# Patient Record
Sex: MALE | Race: WHITE
[De-identification: names, ages, dates, MRNs, and addresses within clinical notes are randomized per-mention and may not be internally consistent; named-entity substitution may affect disease eponyms.]

---

## 2017-05-13 ENCOUNTER — HOSPITAL ENCOUNTER (OUTPATIENT)
Dept: HOSPITAL 45 - C.LAB | Age: 72
Discharge: HOME | End: 2017-05-13
Attending: FAMILY MEDICINE
Payer: COMMERCIAL

## 2017-05-13 DIAGNOSIS — S30.861A: Primary | ICD-10-CM
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
## 2017-05-13 LAB
LYME DISEASE AB IGG: (no result)
LYME DISEASE AB IGM: (no result)

## 2018-04-03 ENCOUNTER — OFFICE VISIT (OUTPATIENT)
Dept: CARDIOLOGY | Facility: CLINIC | Age: 73
End: 2018-04-03
Payer: COMMERCIAL

## 2018-04-03 ENCOUNTER — HOSPITAL ENCOUNTER (OUTPATIENT)
Dept: RADIOLOGY | Facility: HOSPITAL | Age: 73
Discharge: HOME | End: 2018-04-03
Attending: ORTHOPAEDIC SURGERY
Payer: COMMERCIAL

## 2018-04-03 ENCOUNTER — TRANSCRIBE ORDERS (OUTPATIENT)
Dept: LAB | Facility: HOSPITAL | Age: 73
End: 2018-04-03

## 2018-04-03 ENCOUNTER — OFFICE VISIT (OUTPATIENT)
Dept: PREADMISSION TESTING | Facility: HOSPITAL | Age: 73
End: 2018-04-03
Payer: COMMERCIAL

## 2018-04-03 VITALS
HEIGHT: 73 IN | WEIGHT: 217 LBS | BODY MASS INDEX: 28.76 KG/M2 | SYSTOLIC BLOOD PRESSURE: 124 MMHG | RESPIRATION RATE: 18 BRPM | TEMPERATURE: 97.2 F | HEART RATE: 60 BPM | DIASTOLIC BLOOD PRESSURE: 74 MMHG

## 2018-04-03 VITALS
OXYGEN SATURATION: 98 % | DIASTOLIC BLOOD PRESSURE: 70 MMHG | HEART RATE: 59 BPM | HEIGHT: 73 IN | WEIGHT: 217.13 LBS | SYSTOLIC BLOOD PRESSURE: 110 MMHG | BODY MASS INDEX: 28.78 KG/M2

## 2018-04-03 DIAGNOSIS — M43.16 SPONDYLOLISTHESIS OF LUMBAR REGION: ICD-10-CM

## 2018-04-03 DIAGNOSIS — Z01.810 PREOP CARDIOVASCULAR EXAM: ICD-10-CM

## 2018-04-03 DIAGNOSIS — M43.16 SPONDYLOLISTHESIS OF LUMBAR REGION: Primary | ICD-10-CM

## 2018-04-03 DIAGNOSIS — Z01.818 PRE-OP EVALUATION: Primary | ICD-10-CM

## 2018-04-03 DIAGNOSIS — M51.9 LUMBAR DISC DISEASE: ICD-10-CM

## 2018-04-03 PROBLEM — C61 PROSTATE CANCER (CMS/HCC): Status: ACTIVE | Noted: 2018-04-03

## 2018-04-03 LAB
ABO + RH BLD: NORMAL
ALBUMIN SERPL-MCNC: 4.4 G/DL (ref 3.4–5)
ALP SERPL-CCNC: 50 IU/L (ref 35–126)
ALT SERPL-CCNC: 15 IU/L (ref 16–63)
ANION GAP SERPL CALC-SCNC: 8 MEQ/L (ref 3–15)
APTT PPP: 67 SEC. (ref 23–35)
AST SERPL-CCNC: 20 IU/L (ref 15–41)
BASOPHILS # BLD: 0.05 K/UL (ref 0.01–0.1)
BASOPHILS NFR BLD: 0.7 %
BILIRUB SERPL-MCNC: 0.6 MG/DL (ref 0.3–1.2)
BLD GP AB SCN SERPL QL: NEGATIVE
BUN SERPL-MCNC: 14 MG/DL (ref 8–20)
CALCIUM SERPL-MCNC: 10 MG/DL (ref 8.9–10.3)
CHLORIDE SERPL-SCNC: 105 MMOL/L (ref 98–109)
CO2 SERPL-SCNC: 28 MMOL/L (ref 22–32)
CREAT SERPL-MCNC: 1.1 MG/DL (ref 0.8–1.3)
D AG BLD QL: POSITIVE
EOSINOPHIL # BLD: 0.1 K/UL (ref 0.04–0.54)
EOSINOPHIL NFR BLD: 1.3 %
ERYTHROCYTE [DISTWIDTH] IN BLOOD BY AUTOMATED COUNT: 12.8 % (ref 11.6–14.4)
GFR SERPL CREATININE-BSD FRML MDRD: >60 ML/MIN/1.73M*2
GLUCOSE SERPL-MCNC: 90 MG/DL (ref 70–99)
HCT VFR BLDCO AUTO: 48.3 % (ref 40–51)
HGB BLD-MCNC: 16.4 G/DL (ref 13.7–17.5)
IMM GRANULOCYTES # BLD AUTO: 0.02 K/UL (ref 0–0.08)
IMM GRANULOCYTES NFR BLD AUTO: 0.3 %
INR PPP: 1.4 INR
LYMPHOCYTES # BLD: 2.11 K/UL (ref 1.2–3.5)
LYMPHOCYTES NFR BLD: 28.1 %
MCH RBC QN AUTO: 31 PG (ref 28–33.2)
MCHC RBC AUTO-ENTMCNC: 34 G/DL (ref 32.2–36.5)
MCV RBC AUTO: 91.3 FL (ref 83–98)
MONOCYTES # BLD: 0.64 K/UL (ref 0.3–1)
MONOCYTES NFR BLD: 8.5 %
NEUTROPHILS # BLD: 4.58 K/UL (ref 1.7–7)
NEUTS SEG NFR BLD: 61.1 %
NRBC BLD-RTO: 0 %
PDW BLD AUTO: 9.6 FL (ref 9.4–12.4)
PLATELET # BLD AUTO: 274 K/UL (ref 150–350)
POTASSIUM SERPL-SCNC: 4.5 MMOL/L (ref 3.6–5.1)
PROT SERPL-MCNC: 6.8 G/DL (ref 6–8.2)
PROTHROMBIN TIME: 17.1 SEC. (ref 12.2–14.5)
RBC # BLD AUTO: 5.29 M/UL (ref 4.5–5.8)
SODIUM SERPL-SCNC: 141 MMOL/L (ref 136–144)
WBC # BLD AUTO: 7.5 K/UL (ref 3.8–10.5)

## 2018-04-03 PROCEDURE — 99204 OFFICE O/P NEW MOD 45 MIN: CPT | Performed by: INTERNAL MEDICINE

## 2018-04-03 PROCEDURE — 93000 ELECTROCARDIOGRAM COMPLETE: CPT | Performed by: INTERNAL MEDICINE

## 2018-04-03 PROCEDURE — 85730 THROMBOPLASTIN TIME PARTIAL: CPT

## 2018-04-03 PROCEDURE — 85025 COMPLETE CBC W/AUTO DIFF WBC: CPT

## 2018-04-03 PROCEDURE — 86850 RBC ANTIBODY SCREEN: CPT

## 2018-04-03 PROCEDURE — 80053 COMPREHEN METABOLIC PANEL: CPT

## 2018-04-03 PROCEDURE — 85610 PROTHROMBIN TIME: CPT

## 2018-04-03 PROCEDURE — 71046 X-RAY EXAM CHEST 2 VIEWS: CPT

## 2018-04-03 PROCEDURE — 36415 COLL VENOUS BLD VENIPUNCTURE: CPT

## 2018-04-03 ASSESSMENT — ENCOUNTER SYMPTOMS
ALTERED MENTAL STATUS: 0
WEAKNESS: 0
NAUSEA: 0
HEMATURIA: 0
FREQUENCY: 0
BRUISES/BLEEDS EASILY: 0
CHEST TIGHTNESS: 0
DYSPNEA ON EXERTION: 0
BACK PAIN: 1
DIZZINESS: 0
HEARTBURN: 1
NUMBNESS: 0
CHILLS: 0
WOUND: 0
APNEA: 0
PALPITATIONS: 0
BACK PAIN: 1
PALPITATIONS: 0
SHORTNESS OF BREATH: 0
CONSTIPATION: 0
WEAKNESS: 0
ABDOMINAL PAIN: 0
SYNCOPE: 0
DYSURIA: 0
LIGHT-HEADEDNESS: 0
FEVER: 0
SHORTNESS OF BREATH: 0
VOMITING: 0

## 2018-04-03 ASSESSMENT — PAIN SCALES - GENERAL: PAINLEVEL: 2
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
## 2018-04-03 NOTE — PRE-PROCEDURE INSTRUCTIONS
1. We will call you between 4pm to 7pm on the date before your surgery to determine that arrival time for your procedure.   2. Please report to outpatient registration on the day of your procedure.   3. Please follow the following fasting guidelines:   · Nothing to eat or drink 8 hours prior to arrival   4. Early on the morning of the procedure please take your usual dose of the listed medications with a sip of water:    none   5. Other Instructions: complete body wash as directed   6. If you develop a cold, cough, fever, rash, or other symptom prior to the data of the procedure, please report it to your physician immediately.   7. If you need to cancel the procedure for any reason, please contact your physician or call the unit listed above.   8. Make arrangements to have someone drive you home from the procedure. If you have not arranged for transportation home, your surgery may be cancelled.    9. You may not take public transportation unless accompanied by a responsible person.   10. You may not drive a car or operate complex or potentially dangerous machinery for 24 hours following anesthesia and/or sedation.   11. If it is medically necessary for you to have a longer stay, you will be informed as soon as the decision is made.   12. Do not wear or being anything of value to the hospital including jewelry of any kind. Do not wear make-up or contact lenses. DO bring your glasses and hearing aid.   13. Dress in comfortable clothes.   14.  If instructed, please bring a copy of your Advanced Directive (Living Will/Durable Power of ) on the day of your procedure.      Pre operative instructions given as per protocol.  Form explained by: PERFECTO Smallwood     I have read and understand the above information. I have had sufficient opportunity to ask questions I might have and they have been answered to my satisfaction. I agree to comply with the Patient Responsibilities listed above and have received a  copy of this form.

## 2018-04-03 NOTE — ASSESSMENT & PLAN NOTE
There is no evidence of any active or ongoing cardiac condition at this time.  There is no evidence of any active or ongoing cardiac condition at this time. Patient's cardiac conditions are well compensated.  The proposed procedure in general carries an intermediate risk of cardiac complications.  The patient has intermediate clinical predictors of increased risk of periprocedural cardiac complications. There have been no recent episodes of angina, CHF, or clinical arrhythmias. The overall risk is low. Further testing or interventions are unlikely to improve the patient's risk. Routine perioperative care. He does not take any medication on a regular basis. DVT prophylaxis according to the surgical service's protocol. We will assist you with the management of any medical problems during the hospitalization.

## 2018-04-03 NOTE — PROGRESS NOTES
Pre-Operative   Consult Note         Reason for visit:   Chief Complaint   Patient presents with   • Pre-op Exam     Cardiac Clearance       HPI     Sukumar Garcia comes to the office today for preoperative cardiac evaluation prior to a L3-4 lumbar decompression and fusion with Dr. Tu Navarro on 18.  He has been experiencing progressive lower back pain which radiates down both legs, left more so than right.    He is very active and rides his bike 6 miles daily.    He denies any chest pain, shortness of breath, edema, palpitations, near syncope or syncope.    Patient denies any history of cardiac disease. Patient denies any chest pain, shortness of breath, dizziness or palpitations. Patient denies any PND or orthopnea.    Past Medical History:   Diagnosis Date   • Hypertension    • Prostate cancer (CMS/HCC) (HCC)     radiation therapy       Past Surgical History:   Procedure Laterality Date   • CARPAL TUNNEL RELEASE Bilateral    • COLONOSCOPY     • KNEE ARTHROSCOPY Bilateral    • POSTERIOR FUSION LUMBAR SPINE     • PROSTATECTOMY     • UPPER GASTROINTESTINAL ENDOSCOPY         History   Smoking Status   • Current Some Day Smoker     Comment: occasional cigar     Social History   Substance Use Topics   • Smoking status: Current Some Day Smoker   • Smokeless tobacco: Never Used      Comment: occasional cigar   • Alcohol use 1.2 oz/week     2 Cans of beer per week      Comment: daily       Family History   Problem Relation Age of Onset   • Cancer Mother       at age 87   • Heart disease Father       at age 55       Allergies:  Patient has no known allergies.    No current outpatient prescriptions on file.     No current facility-administered medications for this visit.        Review of Systems   Constitution: Negative for weakness and malaise/fatigue.   HENT: Negative for congestion.    Cardiovascular: Negative for chest pain, dyspnea on exertion, palpitations and syncope.   Respiratory:  Negative for shortness of breath.    Hematologic/Lymphatic: Does not bruise/bleed easily.   Skin: Negative for rash.   Musculoskeletal: Positive for back pain and joint pain.   Gastrointestinal: Positive for heartburn. Negative for constipation.   Genitourinary: Positive for nocturia. Negative for frequency.   Neurological: Negative for dizziness and light-headedness.   Psychiatric/Behavioral: Negative for altered mental status.       Objective     Vitals:    04/03/18 0923   BP: 110/70   Pulse: (!) 59   SpO2: 98%       Wt Readings from Last 1 Encounters:   04/03/18 98.5 kg (217 lb 2 oz)       Physical Exam   Constitutional: He is oriented to person, place, and time. He appears well-developed and well-nourished. No distress.   HENT:   Head: Normocephalic.   Eyes: Conjunctivae and EOM are normal.   Neck: Normal range of motion. Neck supple.   Cardiovascular: Normal rate and normal heart sounds.    No murmur heard.  Pulmonary/Chest: Effort normal and breath sounds normal. No respiratory distress. He has no wheezes.   Abdominal: Soft. Bowel sounds are normal. He exhibits no distension. There is no tenderness.   Musculoskeletal: Normal range of motion. He exhibits no edema.   Neurological: He is alert and oriented to person, place, and time.   Skin: Skin is warm and dry. No rash noted.       ECG   Sinus bradycardia    Labs   No results found for: WBC, HGB, HCT, PLT, ALT, AST, NA, K, CL, CREATININE, BUN, CO2, PT, GLUF, HGBA1C    Assessment/Plan     Preop cardiovascular exam  There is no evidence of any active or ongoing cardiac condition at this time.  There is no evidence of any active or ongoing cardiac condition at this time. Patient's cardiac conditions are well compensated.  The proposed procedure in general carries an intermediate risk of cardiac complications.  The patient has intermediate clinical predictors of increased risk of periprocedural cardiac complications. There have been no recent episodes of angina,  CHF, or clinical arrhythmias. The overall risk is low. Further testing or interventions are unlikely to improve the patient's risk. Routine perioperative care. He does not take any medication on a regular basis. DVT prophylaxis according to the surgical service's protocol. We will assist you with the management of any medical problems during the hospitalization.                Basim Palomares MD  4/3/2018

## 2018-04-09 ENCOUNTER — HOSPITAL ENCOUNTER (INPATIENT)
Facility: HOSPITAL | Age: 73
LOS: 3 days | Discharge: HOME | DRG: 460 | End: 2018-04-12
Attending: ORTHOPAEDIC SURGERY | Admitting: ORTHOPAEDIC SURGERY
Payer: COMMERCIAL

## 2018-04-09 ENCOUNTER — ANESTHESIA EVENT (INPATIENT)
Dept: OPERATING ROOM | Facility: HOSPITAL | Age: 73
DRG: 460 | End: 2018-04-09
Payer: COMMERCIAL

## 2018-04-09 ENCOUNTER — ANESTHESIA (INPATIENT)
Dept: OPERATING ROOM | Facility: HOSPITAL | Age: 73
DRG: 460 | End: 2018-04-09
Payer: COMMERCIAL

## 2018-04-09 ENCOUNTER — APPOINTMENT (INPATIENT)
Dept: RADIOLOGY | Facility: HOSPITAL | Age: 73
DRG: 460 | End: 2018-04-09
Attending: ORTHOPAEDIC SURGERY
Payer: COMMERCIAL

## 2018-04-09 DIAGNOSIS — M51.9 LUMBAR DISC DISEASE: Primary | ICD-10-CM

## 2018-04-09 PROBLEM — F17.290 SMOKES CIGARS: Chronic | Status: ACTIVE | Noted: 2018-04-09

## 2018-04-09 PROBLEM — M48.061 LUMBAR STENOSIS: Status: ACTIVE | Noted: 2018-04-09

## 2018-04-09 LAB
ABO + RH BLD: NORMAL
ANION GAP SERPL CALC-SCNC: 7 MEQ/L (ref 3–15)
APTT PPP: 29 SEC. (ref 23–35)
BLD GP AB SCN SERPL QL: NEGATIVE
BUN SERPL-MCNC: 13 MG/DL (ref 8–20)
CALCIUM SERPL-MCNC: 8.5 MG/DL (ref 8.9–10.3)
CHLORIDE SERPL-SCNC: 105 MMOL/L (ref 98–109)
CO2 SERPL-SCNC: 26 MMOL/L (ref 22–32)
CREAT SERPL-MCNC: 1 MG/DL (ref 0.8–1.3)
D AG BLD QL: POSITIVE
GFR SERPL CREATININE-BSD FRML MDRD: >60 ML/MIN/1.73M*2
GLUCOSE SERPL-MCNC: 105 MG/DL (ref 70–99)
INR PPP: 1 INR
MAGNESIUM SERPL-MCNC: 2 MG/DL (ref 1.8–2.5)
POTASSIUM SERPL-SCNC: 4.1 MMOL/L (ref 3.6–5.1)
PROTHROMBIN TIME: 13 SEC. (ref 12.2–14.5)
SODIUM SERPL-SCNC: 138 MMOL/L (ref 136–144)

## 2018-04-09 PROCEDURE — 63600000 HC DRUGS/DETAIL CODE: Performed by: ORTHOPAEDIC SURGERY

## 2018-04-09 PROCEDURE — C1713 ANCHOR/SCREW BN/BN,TIS/BN: HCPCS | Performed by: ORTHOPAEDIC SURGERY

## 2018-04-09 PROCEDURE — 25000000 HC PHARMACY GENERAL: Performed by: NURSE ANESTHETIST, CERTIFIED REGISTERED

## 2018-04-09 PROCEDURE — 71000011 HC PACU PHASE 1 EA ADDL MIN: Performed by: ORTHOPAEDIC SURGERY

## 2018-04-09 PROCEDURE — 63700000 HC SELF-ADMINISTRABLE DRUG: Performed by: NURSE PRACTITIONER

## 2018-04-09 PROCEDURE — 99232 SBSQ HOSP IP/OBS MODERATE 35: CPT | Performed by: INTERNAL MEDICINE

## 2018-04-09 PROCEDURE — 63600000 HC DRUGS/DETAIL CODE: Performed by: NURSE ANESTHETIST, CERTIFIED REGISTERED

## 2018-04-09 PROCEDURE — 71000001 HC PACU PHASE 1 INITIAL 30MIN: Performed by: ORTHOPAEDIC SURGERY

## 2018-04-09 PROCEDURE — 27800000 HC SUPPLY/IMPLANTS: Performed by: ORTHOPAEDIC SURGERY

## 2018-04-09 PROCEDURE — 0SG00AJ FUSION OF LUMBAR VERTEBRAL JOINT WITH INTERBODY FUSION DEVICE, POSTERIOR APPROACH, ANTERIOR COLUMN, OPEN APPROACH: ICD-10-PCS | Performed by: ORTHOPAEDIC SURGERY

## 2018-04-09 PROCEDURE — 37000001 HC ANESTHESIA GENERAL: Performed by: ORTHOPAEDIC SURGERY

## 2018-04-09 PROCEDURE — 72100 X-RAY EXAM L-S SPINE 2/3 VWS: CPT

## 2018-04-09 PROCEDURE — 25800000 HC PHARMACY IV SOLUTIONS: Performed by: NURSE PRACTITIONER

## 2018-04-09 PROCEDURE — 36415 COLL VENOUS BLD VENIPUNCTURE: CPT | Performed by: ORTHOPAEDIC SURGERY

## 2018-04-09 PROCEDURE — 36000015 HC OR LEVEL 5 EA ADDL MIN: Performed by: ORTHOPAEDIC SURGERY

## 2018-04-09 PROCEDURE — 36000004 HC OR LEVEL 4 INITIAL 30MIN: Performed by: ORTHOPAEDIC SURGERY

## 2018-04-09 PROCEDURE — 25000000 HC PHARMACY GENERAL: Performed by: ORTHOPAEDIC SURGERY

## 2018-04-09 PROCEDURE — 63600000 HC DRUGS/DETAIL CODE: Performed by: NURSE PRACTITIONER

## 2018-04-09 PROCEDURE — 36415 COLL VENOUS BLD VENIPUNCTURE: CPT | Performed by: NURSE PRACTITIONER

## 2018-04-09 PROCEDURE — 36000014 HC OR LEVEL 4 EA ADDL MIN: Performed by: ORTHOPAEDIC SURGERY

## 2018-04-09 PROCEDURE — 27200000 HC STERILE SUPPLY: Performed by: ORTHOPAEDIC SURGERY

## 2018-04-09 PROCEDURE — 80048 BASIC METABOLIC PNL TOTAL CA: CPT | Performed by: NURSE PRACTITIONER

## 2018-04-09 PROCEDURE — 20600000 HC ROOM AND CARE INTERMEDIATE/TELEMETRY

## 2018-04-09 PROCEDURE — 63600000 HC DRUGS/DETAIL CODE: Performed by: INTERNAL MEDICINE

## 2018-04-09 PROCEDURE — 85730 THROMBOPLASTIN TIME PARTIAL: CPT | Performed by: INTERNAL MEDICINE

## 2018-04-09 PROCEDURE — 36415 COLL VENOUS BLD VENIPUNCTURE: CPT | Performed by: INTERNAL MEDICINE

## 2018-04-09 PROCEDURE — 86901 BLOOD TYPING SEROLOGIC RH(D): CPT

## 2018-04-09 PROCEDURE — 83735 ASSAY OF MAGNESIUM: CPT | Performed by: NURSE PRACTITIONER

## 2018-04-09 PROCEDURE — 36000005 HC OR LEVEL 5 INITIAL 30MIN: Performed by: ORTHOPAEDIC SURGERY

## 2018-04-09 PROCEDURE — 85610 PROTHROMBIN TIME: CPT | Performed by: INTERNAL MEDICINE

## 2018-04-09 DEVICE — SET SCREWS EXPEDIUM: Type: IMPLANTABLE DEVICE | Site: SPINE LUMBAR | Status: FUNCTIONAL

## 2018-04-09 DEVICE — ROD EXPEDIUM 80MM: Type: IMPLANTABLE DEVICE | Site: SPINE LUMBAR | Status: FUNCTIONAL

## 2018-04-09 DEVICE — SCREW EXPEDIUM 7.0 X 45MM: Type: IMPLANTABLE DEVICE | Site: BACK | Status: FUNCTIONAL

## 2018-04-09 DEVICE — CAGE BULLET 11 X 12 X 23MM 5 DEGREE: Type: IMPLANTABLE DEVICE | Site: BACK | Status: FUNCTIONAL

## 2018-04-09 DEVICE — BONE CHIPS 30CC FINE FILLER: Type: IMPLANTABLE DEVICE | Site: BACK | Status: FUNCTIONAL

## 2018-04-09 DEVICE — OSTEOSPONGE 3DEMIN 50X25MM: Type: IMPLANTABLE DEVICE | Site: BACK | Status: FUNCTIONAL

## 2018-04-09 RX ORDER — FENTANYL CITRATE 50 UG/ML
50 INJECTION, SOLUTION INTRAMUSCULAR; INTRAVENOUS
Status: DISCONTINUED | OUTPATIENT
Start: 2018-04-09 | End: 2018-04-09 | Stop reason: HOSPADM

## 2018-04-09 RX ORDER — DEXAMETHASONE SODIUM PHOSPHATE 4 MG/ML
INJECTION, SOLUTION INTRA-ARTICULAR; INTRALESIONAL; INTRAMUSCULAR; INTRAVENOUS; SOFT TISSUE AS NEEDED
Status: DISCONTINUED | OUTPATIENT
Start: 2018-04-09 | End: 2018-04-09 | Stop reason: SURG

## 2018-04-09 RX ORDER — GLYCOPYRROLATE 0.6MG/3ML
SYRINGE (ML) INTRAVENOUS AS NEEDED
Status: DISCONTINUED | OUTPATIENT
Start: 2018-04-09 | End: 2018-04-09 | Stop reason: SURG

## 2018-04-09 RX ORDER — BISACODYL 10 MG/1
10 SUPPOSITORY RECTAL DAILY PRN
Status: DISCONTINUED | OUTPATIENT
Start: 2018-04-09 | End: 2018-04-12 | Stop reason: HOSPADM

## 2018-04-09 RX ORDER — DIPHENHYDRAMINE HYDROCHLORIDE 50 MG/ML
12.5 INJECTION INTRAMUSCULAR; INTRAVENOUS
Status: DISCONTINUED | OUTPATIENT
Start: 2018-04-09 | End: 2018-04-09 | Stop reason: HOSPADM

## 2018-04-09 RX ORDER — HYDROMORPHONE HYDROCHLORIDE 1 MG/ML
0.5 INJECTION, SOLUTION INTRAMUSCULAR; INTRAVENOUS; SUBCUTANEOUS
Status: DISCONTINUED | OUTPATIENT
Start: 2018-04-09 | End: 2018-04-09 | Stop reason: HOSPADM

## 2018-04-09 RX ORDER — HYDROMORPHONE HYDROCHLORIDE 1 MG/ML
INJECTION, SOLUTION INTRAMUSCULAR; INTRAVENOUS; SUBCUTANEOUS AS NEEDED
Status: DISCONTINUED | OUTPATIENT
Start: 2018-04-09 | End: 2018-04-09 | Stop reason: SURG

## 2018-04-09 RX ORDER — APREPITANT 40 MG/1
40 CAPSULE ORAL
Status: COMPLETED | OUTPATIENT
Start: 2018-04-09 | End: 2018-04-09

## 2018-04-09 RX ORDER — SODIUM CHLORIDE 9 MG/ML
INJECTION, SOLUTION INTRAVENOUS CONTINUOUS
Status: DISCONTINUED | OUTPATIENT
Start: 2018-04-09 | End: 2018-04-10

## 2018-04-09 RX ORDER — CEFAZOLIN SODIUM/WATER 1 G/10 ML
2 SYRINGE (ML) INTRAVENOUS
Status: COMPLETED | OUTPATIENT
Start: 2018-04-09 | End: 2018-04-09

## 2018-04-09 RX ORDER — AMOXICILLIN 250 MG
1 CAPSULE ORAL 2 TIMES DAILY
Status: DISCONTINUED | OUTPATIENT
Start: 2018-04-09 | End: 2018-04-12 | Stop reason: HOSPADM

## 2018-04-09 RX ORDER — MIDAZOLAM HYDROCHLORIDE 2 MG/2ML
INJECTION, SOLUTION INTRAMUSCULAR; INTRAVENOUS AS NEEDED
Status: DISCONTINUED | OUTPATIENT
Start: 2018-04-09 | End: 2018-04-09 | Stop reason: SURG

## 2018-04-09 RX ORDER — ROCURONIUM BROMIDE 10 MG/ML
INJECTION, SOLUTION INTRAVENOUS AS NEEDED
Status: DISCONTINUED | OUTPATIENT
Start: 2018-04-09 | End: 2018-04-09 | Stop reason: SURG

## 2018-04-09 RX ORDER — PROPOFOL 10 MG/ML
INJECTION, EMULSION INTRAVENOUS AS NEEDED
Status: DISCONTINUED | OUTPATIENT
Start: 2018-04-09 | End: 2018-04-09 | Stop reason: SURG

## 2018-04-09 RX ORDER — EPHEDRINE SULFATE 50 MG/ML
INJECTION, SOLUTION INTRAVENOUS AS NEEDED
Status: DISCONTINUED | OUTPATIENT
Start: 2018-04-09 | End: 2018-04-09 | Stop reason: SURG

## 2018-04-09 RX ORDER — ONDANSETRON HYDROCHLORIDE 2 MG/ML
4 INJECTION, SOLUTION INTRAVENOUS EVERY 8 HOURS PRN
Status: DISCONTINUED | OUTPATIENT
Start: 2018-04-09 | End: 2018-04-12 | Stop reason: HOSPADM

## 2018-04-09 RX ORDER — DIPHENHYDRAMINE HYDROCHLORIDE 50 MG/ML
25 INJECTION INTRAMUSCULAR; INTRAVENOUS EVERY 6 HOURS PRN
Status: DISCONTINUED | OUTPATIENT
Start: 2018-04-09 | End: 2018-04-12 | Stop reason: HOSPADM

## 2018-04-09 RX ORDER — DEXTROSE 40 %
15-30 GEL (GRAM) ORAL AS NEEDED
Status: DISCONTINUED | OUTPATIENT
Start: 2018-04-09 | End: 2018-04-12 | Stop reason: HOSPADM

## 2018-04-09 RX ORDER — NEOSTIGMINE METHYLSULFATE 1 MG/ML
INJECTION INTRAVENOUS AS NEEDED
Status: DISCONTINUED | OUTPATIENT
Start: 2018-04-09 | End: 2018-04-09 | Stop reason: SURG

## 2018-04-09 RX ORDER — FENTANYL CITRATE 50 UG/ML
INJECTION, SOLUTION INTRAMUSCULAR; INTRAVENOUS AS NEEDED
Status: DISCONTINUED | OUTPATIENT
Start: 2018-04-09 | End: 2018-04-09 | Stop reason: SURG

## 2018-04-09 RX ORDER — VANCOMYCIN HYDROCHLORIDE 1 G/20ML
INJECTION, POWDER, LYOPHILIZED, FOR SOLUTION INTRAVENOUS AS NEEDED
Status: DISCONTINUED | OUTPATIENT
Start: 2018-04-09 | End: 2018-04-09 | Stop reason: HOSPADM

## 2018-04-09 RX ORDER — DEXTROSE 50 % IN WATER (D50W) INTRAVENOUS SYRINGE
25 AS NEEDED
Status: DISCONTINUED | OUTPATIENT
Start: 2018-04-09 | End: 2018-04-12 | Stop reason: HOSPADM

## 2018-04-09 RX ORDER — IBUPROFEN 200 MG
16-32 TABLET ORAL AS NEEDED
Status: DISCONTINUED | OUTPATIENT
Start: 2018-04-09 | End: 2018-04-12 | Stop reason: HOSPADM

## 2018-04-09 RX ORDER — ALUMINUM HYDROXIDE, MAGNESIUM HYDROXIDE, AND SIMETHICONE 1200; 120; 1200 MG/30ML; MG/30ML; MG/30ML
30 SUSPENSION ORAL EVERY 4 HOURS PRN
Status: DISCONTINUED | OUTPATIENT
Start: 2018-04-09 | End: 2018-04-12 | Stop reason: HOSPADM

## 2018-04-09 RX ORDER — LIDOCAINE HCL/PF 100 MG/5ML
SYRINGE (ML) INTRAVENOUS AS NEEDED
Status: DISCONTINUED | OUTPATIENT
Start: 2018-04-09 | End: 2018-04-09 | Stop reason: SURG

## 2018-04-09 RX ORDER — CEFAZOLIN SODIUM/WATER 2 G/20 ML
2 SYRINGE (ML) INTRAVENOUS
Status: DISCONTINUED | OUTPATIENT
Start: 2018-04-09 | End: 2018-04-12 | Stop reason: HOSPADM

## 2018-04-09 RX ORDER — ONDANSETRON HYDROCHLORIDE 2 MG/ML
4 INJECTION, SOLUTION INTRAVENOUS
Status: DISCONTINUED | OUTPATIENT
Start: 2018-04-09 | End: 2018-04-09 | Stop reason: HOSPADM

## 2018-04-09 RX ORDER — ONDANSETRON HYDROCHLORIDE 2 MG/ML
INJECTION, SOLUTION INTRAVENOUS AS NEEDED
Status: DISCONTINUED | OUTPATIENT
Start: 2018-04-09 | End: 2018-04-09 | Stop reason: SURG

## 2018-04-09 RX ORDER — OXYCODONE HYDROCHLORIDE 5 MG/1
5-10 TABLET ORAL EVERY 4 HOURS PRN
Status: DISCONTINUED | OUTPATIENT
Start: 2018-04-09 | End: 2018-04-12 | Stop reason: HOSPADM

## 2018-04-09 RX ORDER — DIPHENHYDRAMINE HCL 25 MG
25 CAPSULE ORAL EVERY 6 HOURS PRN
Status: DISCONTINUED | OUTPATIENT
Start: 2018-04-09 | End: 2018-04-12 | Stop reason: HOSPADM

## 2018-04-09 RX ORDER — SODIUM CHLORIDE, SODIUM LACTATE, POTASSIUM CHLORIDE, CALCIUM CHLORIDE 600; 310; 30; 20 MG/100ML; MG/100ML; MG/100ML; MG/100ML
INJECTION, SOLUTION INTRAVENOUS CONTINUOUS
Status: DISCONTINUED | OUTPATIENT
Start: 2018-04-09 | End: 2018-04-09 | Stop reason: HOSPADM

## 2018-04-09 RX ORDER — MORPHINE SULFATE IN 0.9 % NACL 150MG/30ML
PATIENT CONTROLLED ANALGESIA SYRINGE INTRAVENOUS CONTINUOUS
Status: DISCONTINUED | OUTPATIENT
Start: 2018-04-09 | End: 2018-04-10

## 2018-04-09 RX ORDER — ONDANSETRON 4 MG/1
4 TABLET, ORALLY DISINTEGRATING ORAL EVERY 8 HOURS PRN
Status: DISCONTINUED | OUTPATIENT
Start: 2018-04-09 | End: 2018-04-12 | Stop reason: HOSPADM

## 2018-04-09 RX ORDER — MORPHINE SULFATE 2 MG/ML
2 INJECTION, SOLUTION INTRAMUSCULAR; INTRAVENOUS EVERY 4 HOURS PRN
Status: DISCONTINUED | OUTPATIENT
Start: 2018-04-09 | End: 2018-04-12 | Stop reason: HOSPADM

## 2018-04-09 RX ORDER — SODIUM CHLORIDE, SODIUM LACTATE, POTASSIUM CHLORIDE, CALCIUM CHLORIDE 600; 310; 30; 20 MG/100ML; MG/100ML; MG/100ML; MG/100ML
INJECTION, SOLUTION INTRAVENOUS CONTINUOUS
Status: DISCONTINUED | OUTPATIENT
Start: 2018-04-09 | End: 2018-04-09

## 2018-04-09 RX ADMIN — FENTANYL CITRATE 50 MCG: 50 INJECTION, SOLUTION INTRAMUSCULAR; INTRAVENOUS at 13:45

## 2018-04-09 RX ADMIN — CEFAZOLIN SODIUM 2 G: 100 INJECTION, SOLUTION INTRAVENOUS at 13:22

## 2018-04-09 RX ADMIN — ONDANSETRON 4 MG: 2 INJECTION INTRAMUSCULAR; INTRAVENOUS at 13:26

## 2018-04-09 RX ADMIN — EPHEDRINE SULFATE 10 MG: 50 INJECTION, SOLUTION INTRAVENOUS at 13:29

## 2018-04-09 RX ADMIN — SODIUM CHLORIDE: 9 INJECTION, SOLUTION INTRAVENOUS at 18:33

## 2018-04-09 RX ADMIN — Medication 0.5 MG: at 16:00

## 2018-04-09 RX ADMIN — Medication 2 G: at 19:59

## 2018-04-09 RX ADMIN — SODIUM CHLORIDE, SODIUM LACTATE, POTASSIUM CHLORIDE, CALCIUM CHLORIDE: 600; 310; 30; 20 INJECTION, SOLUTION INTRAVENOUS at 11:44

## 2018-04-09 RX ADMIN — GLYCOPYRROLATE 0.6 MG: 0.2 INJECTION INTRAMUSCULAR; INTRAVENOUS at 15:44

## 2018-04-09 RX ADMIN — Medication 1 MG: at 14:57

## 2018-04-09 RX ADMIN — EPHEDRINE SULFATE 10 MG: 50 INJECTION, SOLUTION INTRAVENOUS at 14:43

## 2018-04-09 RX ADMIN — DEXAMETHASONE SODIUM PHOSPHATE 8 MG: 4 INJECTION, SOLUTION INTRA-ARTICULAR; INTRALESIONAL; INTRAMUSCULAR; INTRAVENOUS; SOFT TISSUE at 13:26

## 2018-04-09 RX ADMIN — FENTANYL CITRATE 50 MCG: 50 INJECTION, SOLUTION INTRAMUSCULAR; INTRAVENOUS at 16:39

## 2018-04-09 RX ADMIN — ROCURONIUM BROMIDE 10 MG: 10 INJECTION, SOLUTION INTRAVENOUS at 14:17

## 2018-04-09 RX ADMIN — SODIUM CHLORIDE, SODIUM LACTATE, POTASSIUM CHLORIDE, CALCIUM CHLORIDE: 600; 310; 30; 20 INJECTION, SOLUTION INTRAVENOUS at 13:17

## 2018-04-09 RX ADMIN — MIDAZOLAM HYDROCHLORIDE 2 MG: 1 INJECTION, SOLUTION INTRAMUSCULAR; INTRAVENOUS at 13:22

## 2018-04-09 RX ADMIN — APREPITANT 40 MG: 40 CAPSULE ORAL at 09:23

## 2018-04-09 RX ADMIN — SENNOSIDES AND DOCUSATE SODIUM 1 TABLET: 8.6; 5 TABLET ORAL at 20:00

## 2018-04-09 RX ADMIN — FENTANYL CITRATE 50 MCG: 50 INJECTION, SOLUTION INTRAMUSCULAR; INTRAVENOUS at 13:25

## 2018-04-09 RX ADMIN — Medication: at 16:43

## 2018-04-09 RX ADMIN — ROCURONIUM BROMIDE 50 MG: 10 INJECTION, SOLUTION INTRAVENOUS at 13:26

## 2018-04-09 RX ADMIN — ROCURONIUM BROMIDE 10 MG: 10 INJECTION, SOLUTION INTRAVENOUS at 13:45

## 2018-04-09 RX ADMIN — LIDOCAINE HYDROCHLORIDE 5 ML: 20 INJECTION, SOLUTION INTRAVENOUS at 13:26

## 2018-04-09 RX ADMIN — Medication 0.5 MG: at 16:10

## 2018-04-09 RX ADMIN — FENTANYL CITRATE 50 MCG: 50 INJECTION, SOLUTION INTRAMUSCULAR; INTRAVENOUS at 13:23

## 2018-04-09 RX ADMIN — FENTANYL CITRATE 50 MCG: 50 INJECTION, SOLUTION INTRAMUSCULAR; INTRAVENOUS at 14:56

## 2018-04-09 RX ADMIN — VANCOMYCIN HYDROCHLORIDE 1000 MG: 1 INJECTION, POWDER, LYOPHILIZED, FOR SOLUTION INTRAVENOUS at 11:55

## 2018-04-09 RX ADMIN — FENTANYL CITRATE 50 MCG: 50 INJECTION, SOLUTION INTRAMUSCULAR; INTRAVENOUS at 16:10

## 2018-04-09 RX ADMIN — FENTANYL CITRATE 25 MCG: 50 INJECTION, SOLUTION INTRAMUSCULAR; INTRAVENOUS at 16:00

## 2018-04-09 RX ADMIN — ROCURONIUM BROMIDE 5 MG: 10 INJECTION, SOLUTION INTRAVENOUS at 13:56

## 2018-04-09 RX ADMIN — Medication 0.5 MG: at 16:48

## 2018-04-09 RX ADMIN — NEOSTIGMINE METHYLSULFATE 3 MG: 1 INJECTION INTRAVENOUS at 15:44

## 2018-04-09 RX ADMIN — FENTANYL CITRATE 25 MCG: 50 INJECTION, SOLUTION INTRAMUSCULAR; INTRAVENOUS at 15:47

## 2018-04-09 RX ADMIN — FENTANYL CITRATE 50 MCG: 50 INJECTION, SOLUTION INTRAMUSCULAR; INTRAVENOUS at 13:58

## 2018-04-09 RX ADMIN — PROPOFOL 200 MG: 10 INJECTION, EMULSION INTRAVENOUS at 13:26

## 2018-04-09 ASSESSMENT — COGNITIVE AND FUNCTIONAL STATUS - GENERAL
STANDING UP FROM CHAIR USING ARMS: 4 - NONE
HELP NEEDED FOR BATHING: 4 - NONE
EATING MEALS: 4 - NONE
DRESSING REGULAR LOWER BODY CLOTHING: 4 - NONE
TOILETING: 4 - NONE
WALKING IN HOSPITAL ROOM: 3 - A LITTLE
HELP NEEDED FOR PERSONAL GROOMING: 4 - NONE
DRESSING REGULAR UPPER BODY CLOTHING: 4 - NONE
MOVING TO AND FROM BED TO CHAIR: 4 - NONE
CLIMB 3 TO 5 STEPS WITH RAILING: 4 - NONE

## 2018-04-09 NOTE — INTERVAL H&P NOTE
H&P reviewed. The patient was examined and there are no changes to the H&P.  Will recheck PT/PTT INR

## 2018-04-09 NOTE — ASSESSMENT & PLAN NOTE
- Routine postop care per orthopedic service, including pain management and DVT prophylaxis  - Incentive spirometry encouraged  - Early mobilization, physical therapy per protocol

## 2018-04-09 NOTE — PROGRESS NOTES
Pt seen and examined in PACU   Resting comfortably   Without complaints   States he had LLE numbness pre-op   Pain well controlled   Denies SOB/CP; denies N/V      Pt awakens to verbal stimuli, drowsy   VSS; NAD; resp unlabored   Dressing CDI; Jpx1   SILT throughout;   strength intact throughout 5/5 in B/L LE     A/p POD 0 s/p L3-L4 PLDF   Pain control: morphine PCA; transition to oral pain meds in AM   IVFs until POD 1  DVT ppx: SCDs, ambulation   Neuro checks   Monitor I&Os   PT/OT/ OOB WBAT   Medical management per [facility]      dispo :4S

## 2018-04-09 NOTE — ASSESSMENT & PLAN NOTE
- Recommend complete cessation from tobacco and all related substances to reduce the risk of CVA, MI, and even premature death

## 2018-04-09 NOTE — PROGRESS NOTES
"     Inpatient Cardiology   Daily Progress Note       Overview     Assessment/Plan   Lumbar stenosis   Overview    - S/P L3-L4 PLDF on 4/9/2018. OR EBL 50 cc     Assessment & Plan    - Routine postop care per orthopedic service, including pain management and DVT prophylaxis  - Incentive spirometry encouraged  - Early mobilization, physical therapy per protocol        Prostate cancer (CMS/[facility]) (HCC)   Overview    - Treated with radiation therapy     Assessment & Plan    - [facility]  - Monitor for s/s of post-op urinary retention                Subjective   Patient seen and examined in PACU, chart reviewed. Patient states low back incision is \" a litte achy\" otherwise offers no complaints. He denies CP , SOB, HA or nausea.   Current Medications:    Scheduled:       Infusions:    lactated ringer's    morphine in 0.9 % NaCl    sodium chloride        PRN:  •  diphenhydrAMINE  •  fentaNYL  •  HYDROmorphone  •  morphine in 0.9 % NaCl **AND** naloxone (NARCAN) IV syringe 0.04 mg/mL **AND** Vital Signs, Pulse Oximetry, Sedation Score, and End Tidal CO2 (not required for Comfort Care patients) **AND** End Tidal CO2 Monitoring **AND** Notify physician (specify):Systolic blood pressure less than: 100; Respiratory rate less than: 6 **AND** For PCA discontinuation: reassess patient and document within a minimum of 1 hour (refer to Patient Controlled Analgesia Policy) **AND** PCA Nursing Documentation  •  ondansetron   Objective   Vital signs in last 24 hours:  Temp:  [35.6 °C (96 °F)-36.7 °C (98.1 °F)] 36.7 °C (98.1 °F)  Heart Rate:  [48-79] 55  Resp:  [6-20] 11  BP: (139-164)/() 149/68  FiO2 (%) (Set):  [4 %] 4 %    Wt Readings from Last 3 Encounters:   04/09/18 98.4 kg (217 lb)   04/03/18 98.4 kg (217 lb)   04/03/18 98.5 kg (217 lb 2 oz)       Physical Exam   Constitutional: He is oriented to person, place, and time. He appears well-developed and well-nourished. No distress.   HENT:   Head: Normocephalic and atraumatic. "   Eyes: Conjunctivae and EOM are normal.   Neck: Neck supple. No JVD present.   Cardiovascular: Normal rate, regular rhythm, S1 normal, S2 normal, normal heart sounds and intact distal pulses.    Pulses:       Dorsalis pedis pulses are 1+ on the right side, and 1+ on the left side.        Posterior tibial pulses are 3+ on the right side, and 3+ on the left side.   Pulmonary/Chest: Effort normal. Bradypnea noted. No apnea. He has decreased breath sounds in the right lower field and the left lower field. He has no wheezes. He has no rhonchi. He has no rales.   Abdominal: Soft. Bowel sounds are normal. He exhibits no distension. There is no tenderness.   Musculoskeletal: Normal range of motion. He exhibits no edema or deformity.   Lumbar wound with ALIVIA draining ss fluid   Neurological: He is oriented to person, place, and time.   Mild drowsiness post-op, non-focal   Skin: Skin is warm and dry. No rash noted. He is not diaphoretic. No erythema. No pallor.   Psychiatric: He has a normal mood and affect. His behavior is normal. Judgment and thought content normal.   Nursing note and vitals reviewed.      Labs and Data:      Lab Results   Component Value Date    WBC 7.50 04/03/2018    HGB 16.4 04/03/2018    HCT 48.3 04/03/2018     04/03/2018    ALT 15 (L) 04/03/2018    AST 20 04/03/2018     04/03/2018    K 4.5 04/03/2018     04/03/2018    CREATININE 1.1 04/03/2018    BUN 14 04/03/2018    CO2 28 04/03/2018    INR 1.0 04/09/2018       Radiology:  I have independently reviewed the pertinent imaging from the last 24 hrs.    Telemetry  NSR in PACU            PERFECTO Vazquez  4/9/2018

## 2018-04-09 NOTE — ANESTHESIA PROCEDURE NOTES
Airway  Urgency: elective    Start Time: 4/9/2018 1:28 PM  Airway not difficult    General Information and Staff    Patient location during procedure: OR  Anesthesiologist: ALVARO TORRES  Resident/CRNA: EVELYN PATTEN  Performed: resident/CRNA     Indications and Patient Condition  Indications for airway management: anesthesia  Sedation level: deep  Preoxygenated: yes  Patient position: sniffing  MILS not maintained throughout  Mask difficulty assessment: 1 - vent by mask    Final Airway Details  Final airway type: endotracheal airway      Successful airway: ETT  Cuffed: yes   Successful intubation technique: direct laryngoscopy  Facilitating devices/methods: intubating stylet  Endotracheal tube insertion site: oral  Blade: Carmen  Blade size: #3  ETT size: 8.0 mm  Cormack-Lehane Classification: grade I - full view of glottis  Placement verified by: chest auscultation, capnometry and palpation of cuff   Measured from: lips  ETT to lips (cm): 22  Number of attempts at approach: 1  Ventilation between attempts: none  Number of other approaches attempted: 0

## 2018-04-09 NOTE — ANESTHESIA PREPROCEDURE EVALUATION
Anesthesia ROS/MED HX    Anesthesia History - neg  Pulmonary - neg  Neuro/Psych - neg  Cardiovascular   ECG reviewed   hypertension  GI/Hepatic- neg  Musculoskeletal- neg  Endo/Other- neg      Relevant Problems   No active problems are marked relevant to this note.       Physical Exam    Airway   Mallampati: II   TM distance: >3 FB   Neck ROM: full  Cardiovascular - normal   Rhythm: regular   Rate: normal  Pulmonary - normal   clear to auscultation  Dental           Anesthesia Plan    Plan: general    Technique: general endotracheal     Lines and Monitors: PIV     Airway: oral intubation and direct visual laryngoscopy   ASA 2  Anesthetic plan and risks discussed with: patient  Induction:    intravenous   Postop Plan:   Patient Disposition: inpatient floor planned admission   Pain Management: IV analgesics

## 2018-04-09 NOTE — DISCHARGE INSTRUCTIONS
DISCHARGE INSTRUCTIONS:  LUMBAR FUSION  INCISON  Please make sure your incisions are checked at least twice daily for signs and symptoms of infection. If any of the below should occur, please call the office.  ? Draining of incisional site  ? Opening of incision  ? Fevers greater than 101.5 (low grade fevers post op are normal)  ? Flu-like symptoms  ? Increased redness and/or tenderness around the incision  If you have staples or sutures (not tape) in your incision they may be removed 2 weeks following your surgery. This may be done by a visiting nurse, family physician or by making an appointment to come into the office.  SHOWERING and the GAUZE DRESSING  ? You should change the gauze dressing over your incision every day and keep the incision  covered with dry sterile gauze and tape.  ? You may stop covering the incision with gauze five days after your surgery, as long as  the incision site is not leaking or draining fluid,  ? You may shower starting five days after your surgery (if incision is dry and intact). After  showering, gently dry the incision site.  ? Hair washing is permissible while in the shower. No tub baths, hot tubs or whirlpools  until seen in the office. Do not rub cream or lotion on the incision until seen in the  office.  EXERCISE  ? Only lift objects weighing less than 10-15 lbs  ? Do not bend or twist at the waist. Always bend your knees!!  ? Limit your sitting to 20-30 minute intervals. You should lie down or walk in between  sitting periods. There are no limitations for sitting in a recliner chair.  ? Walk as much as possible-let discomfort be your guide.  You may also go up and down  stairs as much as you can tolerate.  ? Walking outside (as long as it is nice weather) or walking on a treadmill is permitted (no  incline).  PAIN  ? Pain is expected after surgery. You should have a pain medication and muscle relaxer when you leave the hospital. Take the pain medication as needed.  Start with 1 and supplement with extra strength Tylenol before using another. Take 1 muscle relaxer for muscle spasms in the back, you can take it 3 times a day if needed.  It is usually most helpful at night and in the morning.  ? Take your pain medication as needed. As your pain level decreases, you may begin to take over-the­ counter Extra Strength Tylenol (3000mg/day maximum).  ? DO NOT take any anti-inflammatories (like Motrin, Ibuprofen, Advil, Aleve, Celebrex, etc) for 12 weeks after surgery. Taking anti-inflammatories can interfere with fusion healing.  ? Do not resume taking Fosamax or Actonel for 12 weeks after your fusion surgery.  ? You must avoid nicotine exposure for at least 12 weeks after surgery including smoking, the patch, nicotine gum and second hand smoke.  BLOODCLOTS  ? Some swelling if normal post operatively. If you notice swelling in one or both of your legs that is painful and/or hot to the touch and/or you have shortness of breath - please give us a call.  CONSTIPATION  ? It is normal to be constipated after surgery due to anesthesia and narcotics. Make sure to stay hydrated, eat fiber, get up and walk and use the Colace that is prescribed. You can also use warm prune juice and get over the counter Miralax, suppositories, enemas and Magnesium Citrate if constipation persists.  DRIVING  ? You may not drive a car until told otherwise by your physician. You may be a passenger  for short distances (20-30 minutes).  ? If you must take a longer trip make sure to make several stops so that you can walk  around and stretch your legs. Reclining the passenger seat seems to be the most  comfortable position for most patients.        FOLLOW-UP APPOINTMENTS  ? Your first post-op visit will be with Rosa Maria Wells, RITESH, ANP-C, ONP-C. This appointment was made for you when you signed up for surgery. If you do not have a post op appointment, please call to make one.  ? If you have any additional  questions/concerns please contact Rosa Maria Wells DNP, ANP-C, ONP-C., or SHAUN Rubio at [phone].
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
## 2018-04-09 NOTE — PROGRESS NOTES
Seen and evaluated at bedside with wife  States leg pain has improved  NAD VSS  Pain well controlled    Jpx1 serosang drainage  5/5 strength bilateral hip flexors, quads, tib ant, EHL, gastroc  SILT throughout  Extremities warm well perfused    -PT/OT  -pain control  -scds   -abx with drain  -cardiology recs

## 2018-04-10 ENCOUNTER — APPOINTMENT (INPATIENT)
Dept: PHYSICAL THERAPY | Facility: HOSPITAL | Age: 73
DRG: 460 | End: 2018-04-10
Attending: NURSE PRACTITIONER
Payer: COMMERCIAL

## 2018-04-10 ENCOUNTER — APPOINTMENT (INPATIENT)
Dept: OCCUPATIONAL THERAPY | Facility: HOSPITAL | Age: 73
DRG: 460 | End: 2018-04-10
Attending: NURSE PRACTITIONER
Payer: COMMERCIAL

## 2018-04-10 PROBLEM — E87.1 HYPONATREMIA: Status: ACTIVE | Noted: 2018-04-10

## 2018-04-10 LAB
ANION GAP SERPL CALC-SCNC: 7 MEQ/L (ref 3–15)
BUN SERPL-MCNC: 12 MG/DL (ref 8–20)
CALCIUM SERPL-MCNC: 8.4 MG/DL (ref 8.9–10.3)
CHLORIDE SERPL-SCNC: 103 MMOL/L (ref 98–109)
CO2 SERPL-SCNC: 24 MMOL/L (ref 22–32)
CREAT SERPL-MCNC: 1 MG/DL (ref 0.8–1.3)
ERYTHROCYTE [DISTWIDTH] IN BLOOD BY AUTOMATED COUNT: 12.2 % (ref 11.6–14.4)
GFR SERPL CREATININE-BSD FRML MDRD: >60 ML/MIN/1.73M*2
GLUCOSE SERPL-MCNC: 147 MG/DL (ref 70–99)
HCT VFR BLDCO AUTO: 42.7 % (ref 40–51)
HGB BLD-MCNC: 14.4 G/DL (ref 13.7–17.5)
MCH RBC QN AUTO: 30.7 PG (ref 28–33.2)
MCHC RBC AUTO-ENTMCNC: 33.7 G/DL (ref 32.2–36.5)
MCV RBC AUTO: 91 FL (ref 83–98)
PDW BLD AUTO: 9.7 FL (ref 9.4–12.4)
PLATELET # BLD AUTO: 252 K/UL (ref 150–350)
POTASSIUM SERPL-SCNC: 4.6 MMOL/L (ref 3.6–5.1)
RBC # BLD AUTO: 4.69 M/UL (ref 4.5–5.8)
SODIUM SERPL-SCNC: 134 MMOL/L (ref 136–144)
WBC # BLD AUTO: 8.84 K/UL (ref 3.8–10.5)

## 2018-04-10 PROCEDURE — 97161 PT EVAL LOW COMPLEX 20 MIN: CPT | Mod: GP

## 2018-04-10 PROCEDURE — 99232 SBSQ HOSP IP/OBS MODERATE 35: CPT | Performed by: INTERNAL MEDICINE

## 2018-04-10 PROCEDURE — 63700000 HC SELF-ADMINISTRABLE DRUG: Performed by: NURSE PRACTITIONER

## 2018-04-10 PROCEDURE — 25800000 HC PHARMACY IV SOLUTIONS: Performed by: NURSE PRACTITIONER

## 2018-04-10 PROCEDURE — 80048 BASIC METABOLIC PNL TOTAL CA: CPT | Performed by: NURSE PRACTITIONER

## 2018-04-10 PROCEDURE — 85027 COMPLETE CBC AUTOMATED: CPT | Performed by: NURSE PRACTITIONER

## 2018-04-10 PROCEDURE — 63700000 HC SELF-ADMINISTRABLE DRUG

## 2018-04-10 PROCEDURE — 97166 OT EVAL MOD COMPLEX 45 MIN: CPT | Mod: GO

## 2018-04-10 PROCEDURE — 20600000 HC ROOM AND CARE INTERMEDIATE/TELEMETRY

## 2018-04-10 PROCEDURE — 63600000 HC DRUGS/DETAIL CODE: Performed by: NURSE PRACTITIONER

## 2018-04-10 RX ORDER — SODIUM CHLORIDE 1000 MG
1 TABLET, SOLUBLE MISCELLANEOUS ONCE
Status: COMPLETED | OUTPATIENT
Start: 2018-04-10 | End: 2018-04-10

## 2018-04-10 RX ORDER — SODIUM CHLORIDE 1000 MG
TABLET, SOLUBLE MISCELLANEOUS
Status: COMPLETED
Start: 2018-04-10 | End: 2018-04-10

## 2018-04-10 RX ADMIN — SODIUM CHLORIDE TAB 1 GM 1 G: 1 TAB at 15:48

## 2018-04-10 RX ADMIN — Medication 1 G: at 15:48

## 2018-04-10 RX ADMIN — OXYCODONE HYDROCHLORIDE 5 MG: 5 TABLET ORAL at 15:49

## 2018-04-10 RX ADMIN — SENNOSIDES AND DOCUSATE SODIUM 1 TABLET: 8.6; 5 TABLET ORAL at 19:44

## 2018-04-10 RX ADMIN — SODIUM CHLORIDE: 9 INJECTION, SOLUTION INTRAVENOUS at 01:02

## 2018-04-10 RX ADMIN — Medication 2 G: at 03:55

## 2018-04-10 RX ADMIN — Medication 2 G: at 19:44

## 2018-04-10 RX ADMIN — SENNOSIDES AND DOCUSATE SODIUM 1 TABLET: 8.6; 5 TABLET ORAL at 08:09

## 2018-04-10 RX ADMIN — OXYCODONE HYDROCHLORIDE 5 MG: 5 TABLET ORAL at 05:51

## 2018-04-10 RX ADMIN — OXYCODONE HYDROCHLORIDE 5 MG: 5 TABLET ORAL at 11:20

## 2018-04-10 RX ADMIN — OXYCODONE HYDROCHLORIDE 5 MG: 5 TABLET ORAL at 19:43

## 2018-04-10 RX ADMIN — Medication 2 G: at 11:20

## 2018-04-10 ASSESSMENT — COGNITIVE AND FUNCTIONAL STATUS - GENERAL
TOILETING: 3 - A LITTLE
EATING MEALS: 4 - NONE
STANDING UP FROM CHAIR USING ARMS: 3 - A LITTLE
CLIMB 3 TO 5 STEPS WITH RAILING: 3 - A LITTLE
HELP NEEDED FOR PERSONAL GROOMING: 4 - NONE
DRESSING REGULAR LOWER BODY CLOTHING: 2 - A LOT
HELP NEEDED FOR BATHING: 2 - A LOT
MOVING TO AND FROM BED TO CHAIR: 3 - A LITTLE
WALKING IN HOSPITAL ROOM: 3 - A LITTLE
DRESSING REGULAR UPPER BODY CLOTHING: 3 - A LITTLE

## 2018-04-10 NOTE — PLAN OF CARE
Problem: Patient Care Overview  Goal: Plan of Care Review   04/10/18 0841   Coping/Psychosocial   Plan Of Care Reviewed With patient   Plan of Care Review   Progress progress toward functional goals as expected   Outcome Summary Pt educated in safe ADL resumption and functional mobility within spinal precautions       Problem: Acute Therapy Services Goal & Intervention Plan  Goal: LB Dressing Goal  Outcome: Ongoing (interventions implemented as appropriate)    Goal: Toileting Goal  Outcome: Ongoing (interventions implemented as appropriate)    Goal: Transfer Training Goal  Outcome: Ongoing (interventions implemented as appropriate)    Goal: UB Dressing Goal  Outcome: Ongoing (interventions implemented as appropriate)

## 2018-04-10 NOTE — CONSULTS
Met pt at bedside, verified , PCP. Explained Role of Care Management  Pt lives  With wife in 2 story home.  3Stairs to enter. Bed and bath on 1st  Floor.  PLOF-Independent  Prior use of :  DME: has walker, cane and BSC/raised toilet seat  Home Care Services: years go in area where he lives near [facility]  No anticipated discharge needs.  CC will follow.

## 2018-04-10 NOTE — PROGRESS NOTES
Patient: Sukumar Garcia  Location: [address]  MRN: [id number]  Today's date: 4/10/2018  Pt was left seated in recliner, SCDs donned, call bell within reach, notified nursing         Pain/Vitals     Row Name 04/10/18 0743 04/10/18 0750       Pain/Comfort/Sleep    Presence of Pain complains of pain/discomfort  --    Pain Body Location back  --    Pain Rating (0-10): Rest 3  --    Pain Rating (0-10): Activity 4  --       Vital Signs    Pulse 67 (!)  101    /75 (!)  144/89    Patient Position Sitting Standing    Row Name 04/10/18 0801 04/10/18 0802       Pain/Comfort/Sleep    Presence of Pain complains of pain/discomfort  --    Preferred Pain Scale number (Numeric Rating Pain Scale)  --    Pain Body Location back  --    Pain Rating (0-10): Rest 3  --    Pain Rating (0-10): Activity 2  --    Pain Management Interventions position adjusted  --       Vital Signs    Heart Rate (from SpO2) 65 beats per minute 70 beats per minute    Heart Rate Source Monitor Monitor    SpO2 99 %  --    Patient Activity At rest  --    Oxygen Therapy None (Room air)  --    /75 (!)  144/89    BP Method Automatic Automatic    Patient Position Sitting Standing          Prior Living Environment  Lives With: spouse  Living Arrangements: house  Home Accessibility: stairs to enter home, stairs within home  Living Environment Comment: 1st floor set up. Stall shower with seatNumber of Stairs, Main Entrance: four  Stair Railings, Main Entrance: noneEquipment Currently Used at Home: walker, rolling, commodeLocation, Patient Bedroom: first (main) floorLocation, Bathroom: first (main) floor     Prior Level of Function  Ambulation: independent  Transferring: independent  Toileting: independent  Bathing: independent  Dressing: independent  Eating: independent  Communication: understands/communicates without difficulty  Swallowing: swallows foods/liquids without difficulty  Equipment Currently Used at Home: walker, rolling,  commode  Prior Functional Level Comment: Pt was independent for functional mobility prior to admission           PT Evaluation - 04/10/18 0801        Session Details    Document Type initial evaluation    Mode of Treatment physical therapy       Time Calculation    Start Time 0743    Stop Time 0801    Time Calculation (min) 18 min       General Information    Patient Profile Reviewed? yes    Onset of Illness/Injury or Date of Surgery 04/09/18   s/p L3-4 PLDF    Hearing Precautions/Limitations hearing aid, bilaterally    Existing Precautions/Restrictions fall;spinal       Orientation Log    Name of [facility] 3-->spontaneous/free recall    Month 3-->spontaneous/free recall    Date 3-->spontaneous/free recall    Year 3-->spontaneous/free recall    Day of Week 3-->spontaneous/free recall    Clock Time 3-->spontaneous/free recall       Cognition/Psychosocial    Safety Awareness intact   education regarding spinal precautions       Attention    Behavioral Observations WFL       Sensory    Sensory General Assessment no sensation deficits identified       Range of Motion (ROM)    General Range of Motion no range of motion deficits identified       Manual Muscle Testing (MMT)    General MMT Assessment lower extremity strength deficits identified       MMT: Left Knee    Gross Movement: Extension (4/5) good    Gross Movement: Flexion (4/5) good       MMT: Right Knee    Gross Movement: Extension (4/5) good    Gross Movement: Flexion (4/5) good       Bed Chair WC Transfer Training    Bed Mobility Assessment/Interventions --   pt received OOB in chair    Chair-Bed Transfers, [place] --    Sit-Stand Transfers, [place] 1 person assist;minimum assist (75% patient effort);verbal cues    Verbal Cues (Sit-Stand Transfers) proper use of assistive device;safety    Stand-Sit Transfers, [place] 1 person assist;minimum assist (75% patient effort);verbal cues    Verbal Cues (Stand-Sit Transfers) safety;proper use of assistive  device       Gait Analysis/Training    Gait/Stairs Locomotion Gait Training (Group)       Gait Training    [place] close supervision;verbal cues    Assistive Device walker, front-wheeled    Distance in Feet 100 feet    Gait Pattern Utilized swing-through    Gait Deviations Identified decreased fei;decreased gait speed;decreased stride length    Maintains Weight Bearing Status able to maintain weight bearing status    Comment VSS, reports decreased pain w/ ambulation, no LOB       Stairs Training    [place] not tested       AM-PAC (TM) - Mobility    Turning from your back to your side while in a flat bed without using bedrails? 3 - A Little    Moving from lying on your back to sitting on the side of a flat bed without using bedrails? 3 - A Little    Moving to and from a bed to a chair? 3 - A Little    Standing up from a chair using your arms? 3 - A Little    To walk in a hospital room? 3 - A Little    Climbing 3-5 steps with a railing? 3 - A Little    AM-PAC (TM) Mobility Score 18       PT Clinical Impression    Patient's Goals For Discharge return to all previous roles/activities    Plan For Care Reviewed: Physical Therapy PT plan for care discussed with patient    Impairments Found (PT Eval) gait, locomotion, and balance;aerobic capacity/endurance    Functional Limitations in Following Categories (PT Eval) self-care;home management    Rehab Potential/Prognosis good, to achieve stated therapy goals    Frequency of Treatment 5 times per week    Problem List decreased ROM;decreased strength;pain    Activity Limitations Related to Problem List functional mobility not performed adequately or safely for household activity;functional mobility not performed adequately or safely for community activity;ambulation not performed safely    Anticipated Equipment Needs at Discharge front wheeled walker    Expected Discharge Disposition home with assist;home with home health                   Education provided this  session. See the Patient Education summary report for full details.    PT Care Plan Goals    Flowsheet Row Most Recent Value   Stair Goal, PT   PT STG: Stairs  supervision required, verbal cues required   STG Number of Stairs  4 [use of SPC]   Physical Therapy STG Date Established: Stairs  04/10/18   PT STG Duration: Stairs  5 days or less   PT STG Outcome: Stairs  goal ongoing      PT Care Plan Goals    Flowsheet Row Most Recent Value   Bed Mobility Goal   Date Goal Established: Bed Mobility  04/10/18   Time to Achieve Goal: Bed Mobility  5 - 7 days   Goal Activity: Bed Mobility  sit to supine/supine to sit   Goal Level of [place]: Bed Mobility  modified independence   Goal Outcome Achieved: Bed Mobility  goal ongoing   Gait Training Goal   Date Goal Established: Gait Training  04/10/18   Time to Achieve Goal: Gait Training  5 - 7 days   Level of Jud Goal: Gait Training  modified independence   Assistive Device Used: Gait Training  walker, rolling   Distance Goal: Gait Training (feet)  200 feet   Goal Outcome Achieved: Gait Training  goal ongoing   Goal Transfer Training   Date Goal Established: Transfer Training  04/10/18   Time to Achieve Goal: Transfer Training  5 - 7 days   Goal Activity: Transfer Training  sit-to-stand/stand-to-sit   Transfer Training Goal, [place] Level  modified independence   Assistive Device Used: Transfer Training  walker, rolling   Goal Outcome Achieved: Transfer Training  goal ongoing   Physical Therapy Goal   Date Goal Established: Physical Therapy  04/10/18   Time to Achieve Goal: Physical Therapy  5 days   Goal Activity: Physical Therapy  Pt will perform a car transfer with supervision and verbal cues   Level of Jud Goal: Physical Therapy  supervision required, verbal cues required   Goal Outcome Achieved: Physical Therapy  goal ongoing

## 2018-04-10 NOTE — PROGRESS NOTES
Inpatient Cardiology   Daily Progress Note       Overview  S/P L3-L4 PLDF on 4/9/2018     Assessment/Plan   Hyponatremia   Overview    - Mild     Assessment & Plan    - Follow BMP  - NaCl tab x 1        Smokes cigars   Assessment & Plan    - Recommend complete cessation from tobacco and all related substances to reduce the risk of CVA, MI, and even premature death        Lumbar stenosis   Overview    - S/P L3-L4 PLDF on 4/9/2018. OR EBL 50 cc     Assessment & Plan    - Routine postop care per orthopedic service, including pain management and DVT prophylaxis  - Incentive spirometry encouraged  - Early mobilization, physical therapy per protocol        Prostate cancer (CMS/HCC) (HCC)   Overview    - Treated with radiation therapy     Assessment & Plan    - [facility] ortho  - Monitor for s/s of post-op urinary retention                Subjective   Patient seen and examined.  Surgical pain controlled.  Patient denies any chest pain, dyspnea, palpitations, or nausea.     Current Medications:    Scheduled:    ceFAZolin 2 g intravenous q8h INT   sennosides-docusate sodium 1 tablet oral BID   sodium chloride 1 g oral Once       Infusions:       PRN:  •  alum-mag hydroxide-simeth  •  bisacodyl  •  glucose **OR** dextrose **OR** glucagon **OR** dextrose in water  •  diphenhydrAMINE **OR** diphenhydrAMINE  •  oxyCODONE **OR** morphine  •  [DISCONTINUED] morphine in 0.9 % NaCl **AND** naloxone (NARCAN) IV syringe 0.04 mg/mL **AND** Vital Signs, Pulse Oximetry, Sedation Score, and End Tidal CO2 (not required for Comfort Care patients) **AND** End Tidal CO2 Monitoring **AND** Notify physician (specify):Systolic blood pressure less than: 100; Respiratory rate less than: 6 **AND** [CANCELED] For PCA discontinuation: reassess patient and document within a minimum of 1 hour (refer to Patient Controlled Analgesia Policy) **AND** [CANCELED] PCA Nursing Documentation  •  ondansetron ODT **OR** ondansetron   Objective   Vital signs in  last 24 hours:  Temp:  [36.2 °C (97.1 °F)-36.7 °C (98.1 °F)] 36.5 °C (97.7 °F)  Heart Rate:  [] 101  Resp:  [6-19] 17  BP: (117-164)/() 144/89  FiO2 (%) (Set):  [4 %] 4 %    Wt Readings from Last 3 Encounters:   04/09/18 98.4 kg (217 lb)   04/03/18 98.4 kg (217 lb)   04/03/18 98.5 kg (217 lb 2 oz)       Physical Exam   Constitutional: He is oriented to person, place, and time. He appears well-developed and well-nourished. No distress.   HENT:   Head: Normocephalic and atraumatic.   Eyes: Conjunctivae and EOM are normal.   Neck: Neck supple. No JVD present.   Cardiovascular: Normal rate, regular rhythm, S1 normal, S2 normal, normal heart sounds and intact distal pulses.    Pulses:       Dorsalis pedis pulses are 1+ on the right side, and 1+ on the left side.        Posterior tibial pulses are 3+ on the right side, and 3+ on the left side.   Pulmonary/Chest: Effort normal. He has no wheezes. He has no rhonchi. He has no rales.   Abdominal: Soft. Bowel sounds are normal. He exhibits no distension. There is no tenderness.   Musculoskeletal: Normal range of motion. He exhibits no edema or deformity.   Lumbar wound with ALIVIA draining ss fluid   Neurological: He is oriented to person, place, and time.   Skin: Skin is warm and dry. No rash noted. He is not diaphoretic. No erythema. No pallor.   Psychiatric: He has a normal mood and affect. His behavior is normal. Judgment and thought content normal.   Nursing note and vitals reviewed.      Labs and Data:      Lab Results   Component Value Date    WBC 8.84 04/10/2018    HGB 14.4 04/10/2018    HCT 42.7 04/10/2018     04/10/2018    ALT 15 (L) 04/03/2018    AST 20 04/03/2018     (L) 04/10/2018    K 4.6 04/10/2018     04/10/2018    CREATININE 1.0 04/10/2018    BUN 12 04/10/2018    CO2 24 04/10/2018    INR 1.0 04/09/2018     Telemetry  SR/SB            Chip Guillen Jr., MD  4/10/2018

## 2018-04-10 NOTE — PLAN OF CARE
Problem: Patient Care Overview  Goal: Plan of Care Review  Outcome: Ongoing (interventions implemented as appropriate)   04/10/18 0933   Coping/Psychosocial   Plan Of Care Reviewed With patient   Plan of Care Review   Progress progress toward functional goals as expected   Outcome Summary Pt presents w/ a minimal level of impairment for mobility which is a change from his baseline        Problem: Acute Therapy Services Goal & Intervention Plan  Goal: Bed Mobility Goal  Outcome: Ongoing (interventions implemented as appropriate)    Goal: Gait Training Goal  Outcome: Ongoing (interventions implemented as appropriate)    Goal: Physical Therapy Goal  Outcome: Ongoing (interventions implemented as appropriate)    Goal: Stairs Goal  Outcome: Ongoing (interventions implemented as appropriate)    Goal: Transfer Training Goal  Outcome: Ongoing (interventions implemented as appropriate)

## 2018-04-10 NOTE — PROGRESS NOTES
Patient: Sukumar Garcia  Location: [address]  MRN: [id number]  Today's date: 4/10/2018          Pain/Vitals     Row Name 04/10/18 0743 04/10/18 0750       Pain/Comfort/Sleep    Presence of Pain complains of pain/discomfort  --    Pain Body Location back  --    Pain Rating (0-10): Rest 3  --    Pain Rating (0-10): Activity 4  --       Vital Signs    Pulse 67 (!)  101    /75 (!)  144/89    Patient Position Sitting Standing          Prior Living Environment  Lives With: spouse  Living Arrangements: house  Home Accessibility: stairs to enter home  Living Environment Comment: 1st floor set up. Stall shower with seat Equipment Currently Used at Home:  (owns walker, commode)       Prior Level of Function  Ambulation: independent  Transferring: independent  Toileting: independent  Bathing: independent  Dressing: independent  Eating: independent  Communication: understands/communicates without difficulty  Swallowing: swallows foods/liquids without difficulty  Equipment Currently Used at Home:  (owns walker, commode)  Prior Functional Level Comment: Active/ indep. + driving and IADLs           OT Evaluation - 04/10/18 0743        Session Details    Document Type initial evaluation    Mode of Treatment occupational therapy       Time Calculation    Start Time 0743    Stop Time 0802    Time Calculation (min) 19 min       General Information    Patient Profile Reviewed? yes    Hearing Precautions/Limitations hearing aid, bilaterally    Existing Precautions/Restrictions fall;spinal       Orientation Log    Comment WNL       Cognition/Psychosocial    Safety Awareness intact       Sensory    Sensory General Assessment no sensation deficits identified       Range of Motion (ROM)    Comment, General Range of Motion BUEs WFL       Manual Muscle Testing (MMT)    Comment BUEs WFL, not formally assessed 2* spinal sx       Bed Chair WC Transfer Training    Comment (Bed Mobility) pt received OOB in chair     Assistive Device (Transfers) walker, front-wheeled    Sit-Stand Transfers, [place] minimum assist (75% patient effort);verbal cues    Verbal Cues (Sit-Stand Transfers) hand placement;technique    Stand-Sit Transfers, [place] minimum assist (75% patient effort);verbal cues    Verbal Cues (Stand-Sit Transfers) hand placement;technique       Upper Body Dressing    Upper Body Dressing Tasks pajama/robe    Upper Body Dressing Position supported standing    Upper Body [place] minimum assist (75% or more patient effort)       Lower Body Dressing    Lower Body Dressing Tasks socks    Lower Body Dressing Position supported sitting    Lower Body Dressing [place] moderate assist (50-74% patient effort)    Concerns (Lower Body Dressing) difficulty maintaining balance during dressing tasks   decreased functional reach       Static Standing Balance    [place], Supported Standing close supervision    Time Able to Maintain Position 45 to 60 seconds    Assistive Device Utilized rolling walker       AM-PAC (TM) - ADL    Putting on and taking off regular lower body clothing? 2 - A Lot    Bathing? 2 - A Lot    Toileting? 3 - A Little    Putting on/taking off regular upper body clothing? 3 - A Little    How much help for taking care of personal grooming? 4 - None    Eating meals? 4 - None    AM-PAC (TM) ADL Score 18       OT Clinical Impression    Patient's Goals For Discharge return home;take care of myself at home;return to all previous roles/activities    Plan For Care Reviewed: Occupational Therapy patient voices agreement with OT plan for care;patient feedback incorporated in OT plan for care;OT plan for care discussed with patient    Impairments Found (OT Eval) aerobic capacity/endurance;gait, locomotion, and balance    Functional Limitations in Following Categories self-care;home management;community/leisure    Disability: Inability to Perform Actions/Activities of Required Roles (OT) community/leisure     Rehab Potential/Prognosis: Occupational Therapy good, to achieve stated therapy goals    Problem List: Occupational Therapy decreased flexibility;impaired balance    Activity Limitations Related to Problem List ambulation not performed safely;BADL activities not performed adequately or safely;functional mobility not performed adequately or safely for household activity    Anticipated Equipment Needs At Discharge dressing equipment    Expected Discharge Disposition home                   Education provided this session. See the Patient Education summary report for full details.    OT Care Plan Goals    Flowsheet Row Most Recent Value   LB Dressing Goal   Date Goal Established: Lower Body Dressing  04/10/18   Time to Achieve Goal: Lower Body Dressing  2 - 3 days   Level of [place] Goal: Lower Body Dressing  modified independence   Adaptive Equipment Used: Lower Body Dressing  dressing stick, laces, elastic, reacher, shoe horn, long handled, sock-aid   Goal Outcome Achieved: Lower Body Dressing  goal ongoing   Toileting Goal   Date Goal Established: Toileting  04/10/18   Time to Achieve Goal: Toileting  2 - 3 days   Level of [place] Goal: Toileting  modified independence   Goal Outcome Achieved: Toileting  goal ongoing   Goal Transfer Training   Date Goal Established: Transfer Training  04/10/18   Time to Achieve Goal: Transfer Training  2 - 3 days   Goal Activity: Transfer Training  all transfers   Transfer Training Goal, [place] Level  modified independence   Assistive Device Used: Transfer Training  shower chair, walker, rolling   Goal Outcome Achieved: Transfer Training  goal ongoing   UB Dressing Goal   Date Goal Established: Upper Body Dressing  04/10/18   Time to Achieve Goal: Upper Body Dressing  5 - 7 days   Level of [place] Goal: Upper Body Dressing  independent   Goal Outcome Achieved: Upper Body Dressing  goal ongoing         Pt left in recliner chair + SCDs with call bell and all needed  items. D/w YOCASTA Vicente MS OTR/L

## 2018-04-10 NOTE — PLAN OF CARE
Problem: Laminectomy/Foraminotomy/Discectomy (Adult)  Goal: Signs and Symptoms of Listed Potential Problems Will be Absent, Minimized or Managed (Laminectomy/Foraminotomy/Discectomy)  Outcome: Ongoing (interventions implemented as appropriate)   04/10/18 1323   Laminectomy/Foraminotomy/Discectomy   Problems Assessed (Laminectomy/Laminotomy/Discectomy) all   Problems Present (Laminectomy/Laminotomy/Discectomy) pain;bowel motility decreased

## 2018-04-10 NOTE — PLAN OF CARE
Problem: Patient Care Overview  Goal: Plan of Care Review  Outcome: Ongoing (interventions implemented as appropriate)   04/10/18 0414   Coping/Psychosocial   Plan Of Care Reviewed With patient   Plan of Care Review   Progress improving     Goal: Individualization & Mutuality  Outcome: Ongoing (interventions implemented as appropriate)      Problem: Laminectomy/Foraminotomy/Discectomy (Adult)  Goal: Signs and Symptoms of Listed Potential Problems Will be Absent, Minimized or Managed (Laminectomy/Foraminotomy/Discectomy)  Outcome: Ongoing (interventions implemented as appropriate)    Goal: Anesthesia/Sedation Recovery  Outcome: Ongoing (interventions implemented as appropriate)

## 2018-04-11 ENCOUNTER — APPOINTMENT (INPATIENT)
Dept: OCCUPATIONAL THERAPY | Facility: HOSPITAL | Age: 73
DRG: 460 | End: 2018-04-11
Payer: COMMERCIAL

## 2018-04-11 ENCOUNTER — APPOINTMENT (INPATIENT)
Dept: PHYSICAL THERAPY | Facility: HOSPITAL | Age: 73
DRG: 460 | End: 2018-04-11
Payer: COMMERCIAL

## 2018-04-11 LAB
ANION GAP SERPL CALC-SCNC: 7 MEQ/L (ref 3–15)
BUN SERPL-MCNC: 18 MG/DL (ref 8–20)
CALCIUM SERPL-MCNC: 8.6 MG/DL (ref 8.9–10.3)
CHLORIDE SERPL-SCNC: 103 MMOL/L (ref 98–109)
CO2 SERPL-SCNC: 27 MMOL/L (ref 22–32)
CREAT SERPL-MCNC: 1 MG/DL (ref 0.8–1.3)
ERYTHROCYTE [DISTWIDTH] IN BLOOD BY AUTOMATED COUNT: 12.5 % (ref 11.6–14.4)
GFR SERPL CREATININE-BSD FRML MDRD: >60 ML/MIN/1.73M*2
GLUCOSE SERPL-MCNC: 106 MG/DL (ref 70–99)
HCT VFR BLDCO AUTO: 38.1 % (ref 40–51)
HGB BLD-MCNC: 13.1 G/DL (ref 13.7–17.5)
MCH RBC QN AUTO: 31.4 PG (ref 28–33.2)
MCHC RBC AUTO-ENTMCNC: 34.4 G/DL (ref 32.2–36.5)
MCV RBC AUTO: 91.4 FL (ref 83–98)
PDW BLD AUTO: 9.9 FL (ref 9.4–12.4)
PLATELET # BLD AUTO: 227 K/UL (ref 150–350)
POTASSIUM SERPL-SCNC: 4.3 MMOL/L (ref 3.6–5.1)
RBC # BLD AUTO: 4.17 M/UL (ref 4.5–5.8)
SODIUM SERPL-SCNC: 137 MMOL/L (ref 136–144)
WBC # BLD AUTO: 11.13 K/UL (ref 3.8–10.5)

## 2018-04-11 PROCEDURE — 85027 COMPLETE CBC AUTOMATED: CPT | Performed by: NURSE PRACTITIONER

## 2018-04-11 PROCEDURE — 63700000 HC SELF-ADMINISTRABLE DRUG: Performed by: NURSE PRACTITIONER

## 2018-04-11 PROCEDURE — 97116 GAIT TRAINING THERAPY: CPT | Mod: GP

## 2018-04-11 PROCEDURE — 20600000 HC ROOM AND CARE INTERMEDIATE/TELEMETRY

## 2018-04-11 PROCEDURE — 97530 THERAPEUTIC ACTIVITIES: CPT | Mod: GP

## 2018-04-11 PROCEDURE — 82947 ASSAY GLUCOSE BLOOD QUANT: CPT | Performed by: NURSE PRACTITIONER

## 2018-04-11 PROCEDURE — 63600000 HC DRUGS/DETAIL CODE: Performed by: NURSE PRACTITIONER

## 2018-04-11 PROCEDURE — 99232 SBSQ HOSP IP/OBS MODERATE 35: CPT | Performed by: INTERNAL MEDICINE

## 2018-04-11 PROCEDURE — 97535 SELF CARE MNGMENT TRAINING: CPT | Mod: GO

## 2018-04-11 RX ORDER — ADHESIVE BANDAGE
30 BANDAGE TOPICAL DAILY PRN
Status: DISCONTINUED | OUTPATIENT
Start: 2018-04-11 | End: 2018-04-12 | Stop reason: HOSPADM

## 2018-04-11 RX ADMIN — OXYCODONE HYDROCHLORIDE 5 MG: 5 TABLET ORAL at 00:12

## 2018-04-11 RX ADMIN — Medication 2 G: at 10:57

## 2018-04-11 RX ADMIN — OXYCODONE HYDROCHLORIDE 5 MG: 5 TABLET ORAL at 13:27

## 2018-04-11 RX ADMIN — BISACODYL 10 MG: 10 SUPPOSITORY RECTAL at 13:23

## 2018-04-11 RX ADMIN — OXYCODONE HYDROCHLORIDE 5 MG: 5 TABLET ORAL at 17:41

## 2018-04-11 RX ADMIN — MAGNESIUM HYDROXIDE 30 ML: 400 SUSPENSION ORAL at 11:59

## 2018-04-11 RX ADMIN — OXYCODONE HYDROCHLORIDE 5 MG: 5 TABLET ORAL at 22:26

## 2018-04-11 RX ADMIN — SENNOSIDES AND DOCUSATE SODIUM 1 TABLET: 8.6; 5 TABLET ORAL at 08:08

## 2018-04-11 RX ADMIN — Medication 2 G: at 19:18

## 2018-04-11 RX ADMIN — Medication 2 G: at 04:16

## 2018-04-11 ASSESSMENT — COGNITIVE AND FUNCTIONAL STATUS - GENERAL
STANDING UP FROM CHAIR USING ARMS: 4 - NONE
DRESSING REGULAR LOWER BODY CLOTHING: 4 - NONE
MOVING TO AND FROM BED TO CHAIR: 4 - NONE
DRESSING REGULAR UPPER BODY CLOTHING: 4 - NONE
TOILETING: 4 - NONE
WALKING IN HOSPITAL ROOM: 4 - NONE
EATING MEALS: 4 - NONE
HELP NEEDED FOR PERSONAL GROOMING: 4 - NONE
CLIMB 3 TO 5 STEPS WITH RAILING: 4 - NONE
HELP NEEDED FOR BATHING: 4 - NONE

## 2018-04-11 NOTE — PROGRESS NOTES
Patient: Sukumar Garcia  Location: [address]  MRN: [id number]  Today's date: 4/11/2018          Pain/Vitals - 04/11/18 0918        Pain/Comfort/Sleep    Presence of Pain denies       Vital Signs    Heart Rate (from SpO2) 77 beats per minute    SpO2 95 %    Patient Activity At rest    Oxygen Therapy None (Room air)    /68    BP Location Left upper arm    BP Method Automatic    Patient Position Sitting          Prior Living Environment  Lives With: spouse  Living Arrangements: house  Home Accessibility: stairs to enter home, stairs within home  Living Environment Comment: 1st floor set up. Stall shower with seatNumber of Stairs, Main Entrance: four  Stair Railings, Main Entrance: noneEquipment Currently Used at Home: walker, rolling, commodeLocation, Patient Bedroom: first (main) floorLocation, Bathroom: first (main) floor     Prior Level of Function  Ambulation: independent  Transferring: independent  Toileting: independent  Bathing: independent  Dressing: independent  Eating: independent  Communication: understands/communicates without difficulty  Swallowing: swallows foods/liquids without difficulty  Equipment Currently Used at Home: walker, rolling, commode  Prior Functional Level Comment: Pt was independent for functional mobility prior to admission           OT Treatment Summary - 04/11/18 0927        Session Details    Document Type daily treatment    Mode of Treatment occupational therapy       Time Calculation    Start Time 0927    Stop Time 0944    Time Calculation (min) 17 min       General Information    Patient Profile Reviewed? yes    Existing Precautions/Restrictions spinal       Bed Chair WC Transfer Training    Bed Mobility Assessment/Interventions supine to sit to supine    Comment (Bed Mobility) increased time, education in log roll    Sit-Stand Transfers, [place] modified independence    Stand-Sit Transfers, [place] modified independence    Supine to Sit to  Supine, [place] independent       Shower Transfer Training    [place] modified independence    Techniques Used step over, right entry    Comment pt has stall shower+ chair       Toilet Transfer    [place] modified independence    Assistive Device (Toilet Transfer) commode chair    Transfer Techniques standing pivot    Comment pt has raised toilet seat with arms       Gait Training    [place] modified independence    Assistive Device walker, front-wheeled    Distance in Feet 100 feet       Upper Body Dressing    Comment education in safe technique- demo indep       Lower Body Dressing    Lower Body Dressing Tasks socks    Lower Body Dressing Position unsupported sitting    Lower Body Dressing [place] independent    Comment education in crossed leg/ side sit compensatory strategy       AM-PAC (TM) - ADL    Putting on and taking off regular lower body clothing? 4 - None    Bathing? 4 - None    Toileting? 4 - None    Putting on/taking off regular upper body clothing? 4 - None    How much help for taking care of personal grooming? 4 - None    Eating meals? 4 - None    AM-PAC (TM) ADL Score 24       OT Clinical Impression    Patient's Goals For Discharge return home;return to all previous roles/activities    Rehab Potential/Prognosis: Occupational Therapy good, to achieve stated therapy goals    Anticipated Equipment Needs At Discharge --   owns all needed DME    Expected Discharge Disposition home    Daily Outcome Statement Pt with no further OT needs, safe to d/c home. All education provided                   Education provided this session. See the Patient Education summary report for full details.    OT Care Plan Goals    Flowsheet Row Most Recent Value   Bed Mobility Goal   Date Goal Established: Bed Mobility  04/10/18   Time to Achieve Goal: Bed Mobility  5 - 7 days   Goal Activity: Bed Mobility  sit to supine/supine to sit   Goal Level of [place]: Bed Mobility  modified independence    Goal Outcome Achieved: Bed Mobility  goal ongoing   LB Dressing Goal   Date Goal Established: Lower Body Dressing  04/10/18   Time to Achieve Goal: Lower Body Dressing  2 - 3 days   Level of [place] Goal: Lower Body Dressing  independent   Adaptive Equipment Used: Lower Body Dressing  dressing stick, laces, elastic, reacher, shoe horn, long handled, sock-aid   Goal Outcome Achieved: Lower Body Dressing  goal met   Toileting Goal   Date Goal Established: Toileting  04/10/18   Time to Achieve Goal: Toileting  2 - 3 days   Level of [place] Goal: Toileting  modified independence   Goal Outcome Achieved: Toileting  goal met   Toileting Goal, Progress  simulated   Goal Transfer Training   Date Goal Established: Transfer Training  04/10/18   Time to Achieve Goal: Transfer Training  5 - 7 days   Goal Activity: Transfer Training  all transfers   Transfer Training Goal, [place] Level  modified independence   Assistive Device Used: Transfer Training  walker, rolling   Goal Outcome Achieved: Transfer Training  goal met   UB Dressing Goal   Date Goal Established: Upper Body Dressing  04/10/18   Time to Achieve Goal: Upper Body Dressing  5 - 7 days   Level of [place] Goal: Upper Body Dressing  independent   Goal Outcome Achieved: Upper Body Dressing  goal met        Pt returned from gym by therapy bran Vicente MS OTR/L

## 2018-04-11 NOTE — PLAN OF CARE
Problem: Patient Care Overview  Goal: Plan of Care Review  Outcome: Adequate for Discharge   04/11/18 1019   Coping/Psychosocial   Plan Of Care Reviewed With patient   Plan of Care Review   Progress progress toward functional goals as expected   Outcome Summary Pt. has achieved all PT's goals & appears safe for discharge home.        Problem: Acute Therapy Services Goal & Intervention Plan  Goal: Bed Mobility Goal  Outcome: Adequate for Discharge    Goal: Gait Training Goal  Outcome: Adequate for Discharge    Goal: Physical Therapy Goal  Outcome: Adequate for Discharge    Goal: Stairs Goal  Outcome: Adequate for Discharge    Goal: Transfer Training Goal  Outcome: Adequate for Discharge

## 2018-04-11 NOTE — OP NOTE
REPORT TYPE:  Operative Note    DATE OF OPERATION:  04/09/2018      PREOPERATIVE DIAGNOSIS:  L3-L4 stenosis adjacent to a previous L4-L5 fusion.    POSTOPERATIVE DIAGNOSIS:  L3-L4 stenosis adjacent to a previous L4-L5 fusion.    PROCEDURE:  1.  Bilateral laminectomy L3-L4.  2.  Posterior spinal fusion and transforaminal lumbar interbody fusion L3-L4 with use of posterior nonsegmental instrumentation; use of interbody cage; use of local autograft bone, crushed cancellous allograft bone, and Bacterin bone matrix; placement of   new L3 pedicle screws bilaterally; use of existing L4 and L5 pedicle screws.  4.  Exploration of spinal fusion L4-L5.    SURGEON:  Tu Navarro MD    ASSISTANT: Jones Moses MD    ANESTHESIA:  General endotracheal anesthesia.    COMPLICATIONS:  None.    INSTRUMENTATION:  DePuy.    For a complete discussion of indication for procedures as well as discussion had the patient in my office regarding risks, benefits, and alternatives of treatment, please refer to my office notes and the consent form, which was signed.    DESCRIPTION OF PROCEDURE: The patient was identified in holding area.  The operative site was marked.  The patient was taken to the operating room.  Neurophysiologic monitor leads were applied.  General anesthesia was administered.  The patient was then   prepped and draped in the prone position on a Prashant table.  All bony and soft tissue protuberances were well padded throughout the duration of the procedure.  Throughout the procedure, the patient had SCDs on his bilateral lower extremities.  Prior to   skin incision, intravenous antibiotics were administered.  A formal timeout was performed.  At the end of procedure, sponge and needle counts were correct x2.    After prepping and draping, an incision was made posteriorly to the L3 to L5 level in the midline.  Dissection was carried down through subcutaneous tissue, down to the level of the fascia.  Deep fascia was incised with  electrocautery.  In subperiosteal   fashion, the posterior elements of L3 to L5 were exposed including the previous instrumentation at L4-L5.  Soft tissue retractors were placed.  The previous caps and rods were removed at L4-L5 without difficulty.  The bilateral L4 and L5 screws were   noted to be solidly fixed.  The fusion mass was explored posterolaterally at L4-L5 with electrocautery and noted be a solid posterolateral fusion bilaterally at L4-L5.  A large rongeur was then used to remove the spinous process at L3.  A large rongeur   was then used to thin the lamina at these levels.  There was still a little remaining lamina at L4, which was thinned using a large rongeur.  A 3 mm Kerrison rongeur was then used to remove the ligamentum flavum and perform a laminectomy removing the   remainder of the L4 lamina and the inferior 2/3 of the L3 lamina.  A lateral recess decompression was performed with a 3 mm Kerr[place] rongeur, removing ligamentum flavum, and performing a partial medial facetectomy bilaterally at L3-L4.  Bilateral   foraminotomies were performed at L3-L4 using a 3 mm Kerrison rongeur.  Following this, an osteotome and a mallet was used to osteotomize through the right L3 pars.  The right L3 pars and inferior articular process of L3 was removed using large rongeur.    A 15 blade scalpel was used to sharply incise the annulus on the right side at L3-L4 and through the annulotomy, the L3-L4 interspace was completely debrided free of all disk and cartilaginous material back to bleeding endplate bone using a series of   curved and straight curettes, pituitary rongeurs, and endplate malini.  The interspace was irrigated and inspected to ensure the bleeding bony endplates were exposed.  Local autograft bone from laminectomy was morcellized, combined with crushed   cancellous allograft bone and Bacterin bone matrix.  This bone graft combination was impacted using a tamp and mallet in the L3-L4 interspace.   Additional amounts of this bone graft combination was placed within a DePuy Concorde cage of appropriate size.    The cage was impacted using a tamp and mallet in the L3-L4 interspace.  Secure fit was noted of the cage.  Attention was turned towards instrumentation.  Pedicles were identified from within the spinal canal bilaterally at L3.  A high speed bur was   used to make a starting point for the pedicle screws followed by advancement of pedicle probe through the isthmus of the pedicle into the vertebral bodies at appropriate depth.  The holes were tapped and felt with a ball tip probe to ensure there were no   breaches.  Following this, screws of appropriate width and length were placed bilaterally at L3.  Secure fixation was noted with each screw purchase.  All screws tested well with EMG without evidence of medial breach.  Following this, the bilateral   transverse processes at L3, the left-sided L3-L4 facet joint and then the posterolateral fusion mass at L4 were decorticated using a high speed bur.  The remainder of the bone graft combination was packed over the decorticated bone surfaces bilaterally   at L3-L4.  Precut and contoured rods were placed in the screws bilaterally L3 to L5.  Caps were placed over the rods, into the screws and finally tightened into place according to the 's recommendations.  Intraoperative AP and lateral x-rays   were taken, which showed adequate positioning of instrumentation and interbody cage.  Following this, the adequacy of decompression was confirmed with a [place], which was passed out the foramen along the lateral recess bilaterally at L3-L4.  There was   no remaining stenosis or compression noted.  The exiting and traversing nerve roots bilaterally at L3-L4 were fully decompressed.  Hemostasis was obtained using electrocautery, bipolar and FloSeal.  The wound was copiously irrigated with   antibiotic-impregnated solution.  A drain was placed deep to the  fascia.  Deep fascia was closed in interrupted fashion.  Subcutaneous tissue was closed in interrupted fashion.  Skin was closed in running fashion followed by dry dressing and tape.  The   patient was turned supine on the hospital, extubated and transferred to the PACU in stable condition.  There were no complications.      RICKY TALBOT MD        CC:     DD: 04/10/2018 17:14  DT: 04/11/2018 03:12  Voice ID: [id number]/Report ID: [id number]  brett

## 2018-04-11 NOTE — PLAN OF CARE
Problem: Patient Care Overview  Goal: Plan of Care Review  Outcome: Ongoing (interventions implemented as appropriate)   04/11/18 0441   Coping/Psychosocial   Plan Of Care Reviewed With patient   Plan of Care Review   Progress improving     Goal: Individualization & Mutuality  Outcome: Ongoing (interventions implemented as appropriate)      Problem: Laminectomy/Foraminotomy/Discectomy (Adult)  Goal: Signs and Symptoms of Listed Potential Problems Will be Absent, Minimized or Managed (Laminectomy/Foraminotomy/Discectomy)  Outcome: Ongoing (interventions implemented as appropriate)    Goal: Anesthesia/Sedation Recovery  Outcome: Outcome(s) Achieved Date Met: 04/11/18

## 2018-04-11 NOTE — PROGRESS NOTES
Inpatient Cardiology   Daily Progress Note       Overview  S/P L3-L4 PLDF on 4/9/2018     Assessment/Plan   Hyponatremia   Overview    - Resolved     Assessment & Plan    - Follow BMP        Smokes cigars   Assessment & Plan    - Recommend complete cessation from tobacco and all related substances to reduce the risk of CVA, MI, and even premature death        Lumbar stenosis   Overview    - S/P L3-L4 PLDF on 4/9/2018. OR EBL 50 cc     Assessment & Plan    - Routine postop care per orthopedic service, including pain management and DVT prophylaxis  - Incentive spirometry encouraged  - Early mobilization, physical therapy per protocol  - Can D/C tele        Prostate cancer (CMS/[facility]) (HCC)   Overview    - Treated with radiation therapy     Assessment & Plan    - Monitor for s/s of post-op urinary retention                Subjective   Patient seen and examined.  Surgical pain controlled.  Patient denies any chest pain, dyspnea, palpitations, or nausea.     Current Medications:    Scheduled:    ceFAZolin 2 g intravenous q8h INT   sennosides-docusate sodium 1 tablet oral BID       Infusions:       PRN:  •  alum-mag hydroxide-simeth  •  bisacodyl  •  glucose **OR** dextrose **OR** glucagon **OR** dextrose in water  •  diphenhydrAMINE **OR** diphenhydrAMINE  •  oxyCODONE **OR** morphine  •  [DISCONTINUED] morphine in 0.9 % NaCl **AND** naloxone (NARCAN) IV syringe 0.04 mg/mL **AND** Vital Signs, Pulse Oximetry, Sedation Score, and End Tidal CO2 (not required for Comfort Care patients) **AND** End Tidal CO2 Monitoring **AND** Notify physician (specify):Systolic blood pressure less than: 100; Respiratory rate less than: 6 **AND** [CANCELED] For PCA discontinuation: reassess patient and document within a minimum of 1 hour (refer to Patient Controlled Analgesia Policy) **AND** [CANCELED] PCA Nursing Documentation  •  ondansetron ODT **OR** ondansetron   Objective   Vital signs in last 24 hours:  Temp:  [36.6 °C (97.9  °F)-37.2 °C (98.9 °F)] 37.2 °C (98.9 °F)  Heart Rate:  [61-74] 68  Resp:  [14-18] 16  BP: (103-136)/(58-78) 115/68    Wt Readings from Last 3 Encounters:   04/09/18 98.4 kg (217 lb)   04/03/18 98.4 kg (217 lb)   04/03/18 98.5 kg (217 lb 2 oz)       Physical Exam   Constitutional: He is oriented to person, place, and time. He appears well-developed and well-nourished. No distress.   HENT:   Head: Normocephalic and atraumatic.   Eyes: Conjunctivae and EOM are normal.   Neck: Neck supple. No JVD present.   Cardiovascular: Normal rate, regular rhythm, S1 normal, S2 normal, normal heart sounds and intact distal pulses.    Pulses:       Dorsalis pedis pulses are 1+ on the right side, and 1+ on the left side.        Posterior tibial pulses are 3+ on the right side, and 3+ on the left side.   Pulmonary/Chest: Effort normal. He has no wheezes. He has no rhonchi. He has no rales.   Abdominal: Soft. Bowel sounds are normal. He exhibits no distension. There is no tenderness.   Musculoskeletal: Normal range of motion. He exhibits no edema or deformity.   Lumbar wound with ALIVIA draining ss fluid   Neurological: He is oriented to person, place, and time.   Skin: Skin is warm and dry. No rash noted. He is not diaphoretic. No erythema. No pallor.   Psychiatric: He has a normal mood and affect. His behavior is normal. Judgment and thought content normal.   Nursing note and vitals reviewed.      Labs and Data:      Lab Results   Component Value Date    WBC 11.13 (H) 04/11/2018    HGB 13.1 (L) 04/11/2018    HCT 38.1 (L) 04/11/2018     04/11/2018    ALT 15 (L) 04/03/2018    AST 20 04/03/2018     04/11/2018    K 4.3 04/11/2018     04/11/2018    CREATININE 1.0 04/11/2018    BUN 18 04/11/2018    CO2 27 04/11/2018    INR 1.0 04/09/2018     Telemetry  SR/SB            Chip Guillen Jr., MD  4/11/2018

## 2018-04-11 NOTE — PROGRESS NOTES
Pt without new complaints, doing well  AFVSS ALIVIA in place  5/5 motor BLE all muscle groups  SILT BLE all sens dist  Dressing dry     A/P Stable post op day 2  -pt, oob  -pain control  -abx/drain  -scds  -med management  -dc planning today versus tomorrow depending on drain output and Pt/med clearance

## 2018-04-11 NOTE — PLAN OF CARE
Problem: Patient Care Overview  Goal: Plan of Care Review  Outcome: Ongoing (interventions implemented as appropriate)   04/11/18 1003   Coping/Psychosocial   Plan Of Care Reviewed With patient   Plan of Care Review   Progress progress toward functional goals as expected   Outcome Summary Pt has attained all goals needed for d/c home, education provided       Problem: Acute Therapy Services Goal & Intervention Plan  Goal: LB Dressing Goal  Outcome: Outcome(s) Achieved Date Met: 04/11/18    Goal: Toileting Goal  Outcome: Adequate for Discharge    Goal: Transfer Training Goal  Outcome: Outcome(s) Achieved Date Met: 04/11/18    Goal: UB Dressing Goal  Outcome: Outcome(s) Achieved Date Met: 04/11/18

## 2018-04-11 NOTE — PROGRESS NOTES
Ortho Addendum:  Dressing changed  Incision c/d/i, no erythema or active drainage  ALIVIA in place with serosanguinous fluid, 50mL overnight  Incision redressed with gauze and tape  Pt tolerated  Pt without Bm, RN to administer MOM

## 2018-04-11 NOTE — PLAN OF CARE
Problem: Acute Therapy Services Goal & Intervention Plan  Goal: Toileting Goal  Outcome: Adequate for Discharge

## 2018-04-11 NOTE — PROGRESS NOTES
Patient: Sukumar Garcia  Location: [address]  MRN: [id number]  Today's date: 4/11/2018          Pain/Vitals - 04/11/18 0918        Pain/Comfort/Sleep    Presence of Pain denies       Vital Signs    Heart Rate (from SpO2) 77 beats per minute    SpO2 95 %    Patient Activity At rest    Oxygen Therapy None (Room air)    /68    BP Location Left upper arm    BP Method Automatic    Patient Position Sitting          Prior Living Environment  Lives With: spouse  Living Arrangements: house  Home Accessibility: stairs to enter home, stairs within home  Living Environment Comment: 1st floor set up. Stall shower with seatNumber of Stairs, Main Entrance: four  Stair Railings, Main Entrance: noneEquipment Currently Used at Home: walker, rolling, commodeLocation, Patient Bedroom: first (main) floorLocation, Bathroom: first (main) floor     Prior Level of Function  Ambulation: independent  Transferring: independent  Toileting: independent  Bathing: independent  Dressing: independent  Eating: independent  Communication: understands/communicates without difficulty  Swallowing: swallows foods/liquids without difficulty  Equipment Currently Used at Home: walker, rolling, commode  Prior Functional Level Comment: Pt was independent for functional mobility prior to admission           PT Treatment Summary - 04/11/18 0935        Session Details    Document Type daily treatment    Mode of Treatment physical therapy;individual therapy       Time Calculation    Start Time 0900    Stop Time 0928    Time Calculation (min) 28 min       General Information    Patient Profile Reviewed? yes    Existing Precautions/Restrictions spinal       Coping Strategies    Trust Relationship/Rapport thoughts/feelings acknowledged;questions encouraged;questions answered;care explained       Cognition/Psychosocial    Safety Awareness intact       Bed Chair WC Transfer Training    Bed Mobility Assessment/Interventions bed mobility  "activities    Daniels independent    Comment (Bed Mobility) ed. with log rolling    Sit-Stand Transfers, Daniels modified independence;verbal cues    Verbal Cues (Sit-Stand Transfers) hand placement    Stand-Sit Transfers, Daniels modified independence;verbal cues    Verbal Cues (Stand-Sit Transfers) hand placement;safety;technique    Supine to Sit to Supine, Daniels independent       Car Transfer    Daniels modified independence;verbal cues;nonverbal cues (demo/gesture)    Verbal Cues hand placement;technique;preparatory posture;maintaining precautions       Gait Analysis/Training    Gait/Stairs Locomotion Gait Training (Group);Stairs Training (Group)       Gait Training    Daniels modified independence    Assistive Device walker, front-wheeled    Distance in Feet 200 feet    Gait Pattern Utilized swing-through    Gait Deviations Identified --   mildly guarded.    Maintains Weight Bearing Status able to maintain weight bearing status    Comment Pt. amb. 200 ft. x 1 with RW as AD & 100 ft. x 1 with no AD. Steady gait , VSS t/o tx.        Stairs Training    Daniels modified independence    Stairs, Assistive Device railing    Stairs, Handrail Location right side (ascending)    Number of Stairs 4    Comment Pt. states \"that felt great.\"        LE Supine Therapeutic Exercise    Exercise(s) Performed ankle dorsiflexion;plantarflexion;ankle pumps       AM-PAC (TM) - Mobility    Turning from your back to your side while in a flat bed without using bedrails? 4 - None    Moving from lying on your back to sitting on the side of a flat bed without using bedrails? 4 - None    Moving to and from a bed to a chair? 4 - None    Standing up from a chair using your arms? 4 - None    To walk in a hospital room? 4 - None    Climbing 3-5 steps with a railing? 4 - None    AM-PAC (TM) Mobility Score 24       PT Clinical Impression    Plan For Care Reviewed: Physical Therapy PT plan for care discussed " with patient;patient voices agreement with PT plan for care    Rehab Potential/Prognosis good, to achieve stated therapy goals    Expected Discharge Disposition home    Daily Outcome Statement VSS t/o tx. Pt. bailey tx. well, Ind. with functional activities.                  Pt. Returned to room with volunteer, in recliner.  Education provided this session. See the Patient Education summary report for full details.    PT Care Plan Goals    Flowsheet Row Most Recent Value   Stair Goal, PT   PT STG: Stairs  modified independence   STG Number of Stairs  4   Physical Therapy STG Date Established: Stairs  04/10/18   PT STG Duration: Stairs  5 days or less   PT STG Outcome: Stairs  goal met      PT Care Plan Goals    Flowsheet Row Most Recent Value   Bed Mobility Goal   Date Goal Established: Bed Mobility  04/10/18   Time to Achieve Goal: Bed Mobility  5 - 7 days   Goal Activity: Bed Mobility  all bed mobility activities   Goal Level of [place]: Bed Mobility  modified independence   Goal Outcome Achieved: Bed Mobility  goal met   Gait Training Goal   Date Goal Established: Gait Training  04/10/18   Time to Achieve Goal: Gait Training  5 - 7 days   Level of [place] Goal: Gait Training  modified independence   Assistive Device Used: Gait Training  walker, rolling   Distance Goal: Gait Training (feet)  200 feet   Goal Outcome Achieved: Gait Training  goal met   Goal Transfer Training   Date Goal Established: Transfer Training  04/10/18   Time to Achieve Goal: Transfer Training  by discharge   Goal Activity: Transfer Training  all transfers   Transfer Training Goal, [place] Level  modified independence   Assistive Device Used: Transfer Training  walker, rolling   Goal Outcome Achieved: Transfer Training  goal met   Physical Therapy Goal   Date Goal Established: Physical Therapy  04/10/18   Time to Achieve Goal: Physical Therapy  5 days   Goal Activity: Physical Therapy  Pt will perform a car transfer with  supervision and verbal cues   Level of [place] Goal: Physical Therapy  modified independence   Goal Outcome Achieved: Physical Therapy  goal met

## 2018-04-12 VITALS
HEIGHT: 73 IN | RESPIRATION RATE: 16 BRPM | HEART RATE: 76 BPM | TEMPERATURE: 98.4 F | SYSTOLIC BLOOD PRESSURE: 131 MMHG | DIASTOLIC BLOOD PRESSURE: 64 MMHG | OXYGEN SATURATION: 95 % | WEIGHT: 217 LBS | BODY MASS INDEX: 28.76 KG/M2

## 2018-04-12 LAB
ANION GAP SERPL CALC-SCNC: 9 MEQ/L (ref 3–15)
BUN SERPL-MCNC: 13 MG/DL (ref 8–20)
CALCIUM SERPL-MCNC: 8.7 MG/DL (ref 8.9–10.3)
CHLORIDE SERPL-SCNC: 102 MMOL/L (ref 98–109)
CO2 SERPL-SCNC: 28 MMOL/L (ref 22–32)
CREAT SERPL-MCNC: 0.9 MG/DL (ref 0.8–1.3)
ERYTHROCYTE [DISTWIDTH] IN BLOOD BY AUTOMATED COUNT: 12.5 % (ref 11.6–14.4)
GFR SERPL CREATININE-BSD FRML MDRD: >60 ML/MIN/1.73M*2
GLUCOSE SERPL-MCNC: 106 MG/DL (ref 70–99)
HCT VFR BLDCO AUTO: 40 % (ref 40–51)
HGB BLD-MCNC: 13.5 G/DL (ref 13.7–17.5)
MCH RBC QN AUTO: 31.7 PG (ref 28–33.2)
MCHC RBC AUTO-ENTMCNC: 33.8 G/DL (ref 32.2–36.5)
MCV RBC AUTO: 93.9 FL (ref 83–98)
PDW BLD AUTO: 9.9 FL (ref 9.4–12.4)
PLATELET # BLD AUTO: 229 K/UL (ref 150–350)
POTASSIUM SERPL-SCNC: 4.5 MMOL/L (ref 3.6–5.1)
RBC # BLD AUTO: 4.26 M/UL (ref 4.5–5.8)
SODIUM SERPL-SCNC: 139 MMOL/L (ref 136–144)
WBC # BLD AUTO: 10.95 K/UL (ref 3.8–10.5)

## 2018-04-12 PROCEDURE — 99232 SBSQ HOSP IP/OBS MODERATE 35: CPT | Performed by: INTERNAL MEDICINE

## 2018-04-12 PROCEDURE — 63700000 HC SELF-ADMINISTRABLE DRUG: Performed by: NURSE PRACTITIONER

## 2018-04-12 PROCEDURE — 63600000 HC DRUGS/DETAIL CODE: Performed by: NURSE PRACTITIONER

## 2018-04-12 PROCEDURE — 85027 COMPLETE CBC AUTOMATED: CPT | Performed by: NURSE PRACTITIONER

## 2018-04-12 PROCEDURE — 80048 BASIC METABOLIC PNL TOTAL CA: CPT | Performed by: NURSE PRACTITIONER

## 2018-04-12 RX ORDER — AMOXICILLIN 250 MG
1 CAPSULE ORAL 2 TIMES DAILY PRN
Qty: 60 TABLET | Refills: 0 | Status: SHIPPED | OUTPATIENT
Start: 2018-04-12

## 2018-04-12 RX ORDER — OXYCODONE HYDROCHLORIDE 5 MG/1
5-10 TABLET ORAL EVERY 4 HOURS PRN
Qty: 60 TABLET | Refills: 0 | Status: SHIPPED | OUTPATIENT
Start: 2018-04-12

## 2018-04-12 RX ORDER — ACETAMINOPHEN 325 MG/1
650 TABLET ORAL EVERY 6 HOURS PRN
Start: 2018-04-12

## 2018-04-12 RX ADMIN — OXYCODONE HYDROCHLORIDE 5 MG: 5 TABLET ORAL at 04:21

## 2018-04-12 RX ADMIN — OXYCODONE HYDROCHLORIDE 5 MG: 5 TABLET ORAL at 13:11

## 2018-04-12 RX ADMIN — SENNOSIDES AND DOCUSATE SODIUM 1 TABLET: 8.6; 5 TABLET ORAL at 09:30

## 2018-04-12 RX ADMIN — OXYCODONE HYDROCHLORIDE 5 MG: 5 TABLET ORAL at 09:31

## 2018-04-12 RX ADMIN — Medication 2 G: at 04:16

## 2018-04-12 NOTE — PROGRESS NOTES
Met patient in the room, introduced self, verified ID  Patient states, ALIVIA d/c this am, he will be discharged to home today, no needs per pt. IMM letter presented, explained and signed, copy given, family will provide transportation to home.

## 2018-04-12 NOTE — PROGRESS NOTES
POD 3 s/p PLDF  Pt seen in room resting comfortably. Doing well. No complaints. Pain controlled. Anticipates d/c to home today  AFVSS, NAD  ALIVIA in place and functioning- removed without incident, pt tolerated well.  Incision well healing, negative erythema, negative drainage. Voiding w/o difficulty. +flatus/+BM   BLE: SILT; strength intact all muscle groups; +DP/PT pulses  New dressing CDI    A/P  POD 3 s/p PLDF  -pain control/bowel regimen- pain well controlled on current regimen, requests to minimize narcotic use; has not required valium  -ALIVIA drain- d/c'd  -PT/OT  -DVT ppx: SCDs/mobilization per Dr. Navarro due to high risk of bleeding  -Medical Management: [facility]  Ortho stable for d/c to home today per Dr. Navarro when cleared by medicine/PT/OT

## 2018-04-12 NOTE — PLAN OF CARE
Problem: Patient Care Overview  Goal: Plan of Care Review  Outcome: Ongoing (interventions implemented as appropriate)   04/12/18 0314   Coping/Psychosocial   Plan Of Care Reviewed With patient   Plan of Care Review   Progress improving     Goal: Individualization & Mutuality  Outcome: Ongoing (interventions implemented as appropriate)      Problem: Laminectomy/Foraminotomy/Discectomy (Adult)  Goal: Signs and Symptoms of Listed Potential Problems Will be Absent, Minimized or Managed (Laminectomy/Foraminotomy/Discectomy)  Outcome: Ongoing (interventions implemented as appropriate)

## 2018-04-12 NOTE — DISCHARGE SUMMARY
Ortho Discharge Summary    Admitting Provider: Tu Navarro MD  Discharge Provider: same  Primary Care Physician at Discharge: Yumiko Abreu [phone]     Admission Date: 4/9/2018     Discharge Date: 4/12/2018    Primary Discharge Diagnosis  Lumbar stenosis    Secondary Discharge Diagnosis  same    Discharge Disposition  Home   Code Status at Discharge: Full Code      DETAILS OF HOSPITAL STAY    Presenting Problem/History of Present Illness  Lumbar stenosis [M48.061]     Past Medical History:   Diagnosis Date   • Arthritis    • Hearing deficit, bilateral    • Hypertension    • Prostate cancer (CMS/HCC) (HCC) 2007    radiation therapy   • Wears partial dentures     partial upper         Hospital Course  HPI: 73-year-old M who has had progressively worsening low back pain since October 2017. He reported the pain radiating into B/L legs, worse on the left. After consultation w/ Dr. Tu Navarro at [facility], the above mentioned procedure was indicated.  Hospital Course: Pt underwent the procedure after being medically cleared and tolerated it well. There were no complications.  The pt was transferred to  for continued orthopedic and medical management. The pt had an unremarkable hospital course that consisted of physical therapy for rehabilitation and DVT prophylaxis.  The pt was cleared from an orthopedic and medical standpoint and was discharged to home in stable condition after meeting all clearances for being safe at home.     The pt takes no home medications. He will use Tylenol and Oxycodone prn pain and Senna-S prn constipation. Pt will follow-up w/ Dr. Navarro, in 1-2 weeks as scheduled and call the office w/ any questions. He will follow Dr. Navarro's additional discharge instructions. Follow-up with PCP upon discharge. Discharge instructions were discussed with pt and he stated understanding.  Condition on d/c stable     Operative Procedures Performed  Procedure(s):  L3-4 posterior lumbar  decompression, posterior interbody fusion instrumentation cage allograft autgraft,revision L4-5 instrumentation  Consults:[facility]

## 2018-04-12 NOTE — PROGRESS NOTES
Inpatient Cardiology   Daily Progress Note       Overview: S/P L3-L4 PLDF on 4/9/2018  Patient is medically stable for discharge to home once cleared by ortho and PT   Assessment/Plan   Hyponatremia   Overview    - Resolved     Assessment & Plan    - No further evaluation required        Smokes cigars   Assessment & Plan    - Recommend complete cessation from tobacco and all related substances to reduce the risk of CVA, MI, and even premature death        Prostate cancer (CMS/HCC) (HCC)   Overview    - Treated with radiation therapy     Assessment & Plan    - Monitor for s/s of post-op urinary retention        * Lumbar stenosis   Overview    - S/P L3-L4 PLDF on 4/9/2018. OR EBL 50 cc     Assessment & Plan    - Routine postop care per orthopedic service, including pain management and DVT prophylaxis  - Incentive spirometry encouraged  - Early mobilization, physical therapy per protocol                Subjective   Seen and examined, chart reviewed. Pain well controlled. No CP or SOB. Patient eager for discharge to home.   Current Medications:    Scheduled:    ceFAZolin 2 g intravenous q8h INT   sennosides-docusate sodium 1 tablet oral BID       Infusions:       PRN:  •  alum-mag hydroxide-simeth  •  bisacodyl  •  glucose **OR** dextrose **OR** glucagon **OR** dextrose in water  •  diphenhydrAMINE **OR** diphenhydrAMINE  •  magnesium hydroxide  •  oxyCODONE **OR** morphine  •  [DISCONTINUED] morphine in 0.9 % NaCl **AND** naloxone (NARCAN) IV syringe 0.04 mg/mL **AND** Vital Signs, Pulse Oximetry, Sedation Score, and End Tidal CO2 (not required for Comfort Care patients) **AND** End Tidal CO2 Monitoring **AND** Notify physician (specify):Systolic blood pressure less than: 100; Respiratory rate less than: 6 **AND** [CANCELED] For PCA discontinuation: reassess patient and document within a minimum of 1 hour (refer to Patient Controlled Analgesia Policy) **AND** [CANCELED] PCA Nursing Documentation  •  ondansetron  ODT **OR** ondansetron   Objective   Vital signs in last 24 hours:  Temp:  [36.7 °C (98.1 °F)-36.9 °C (98.4 °F)] 36.9 °C (98.4 °F)  Heart Rate:  [67-76] 76  Resp:  [16-18] 16  BP: (119-139)/(64-71) 131/64    Wt Readings from Last 3 Encounters:   04/09/18 98.4 kg (217 lb)   04/03/18 98.4 kg (217 lb)   04/03/18 98.5 kg (217 lb 2 oz)       Physical Exam   Constitutional: He appears well-developed and well-nourished. No distress.   HENT:   Head: Normocephalic and atraumatic.   Eyes: Conjunctivae and EOM are normal.   Neck: Neck supple. No JVD present.   Cardiovascular: Normal rate, regular rhythm, normal heart sounds and intact distal pulses.  Exam reveals no gallop.    No murmur heard.  Pulmonary/Chest: Effort normal and breath sounds normal. No respiratory distress. He has no wheezes. He has no rales.   Abdominal: Soft. Bowel sounds are normal. He exhibits no distension. There is no tenderness.   Musculoskeletal: Normal range of motion. He exhibits no edema, tenderness or deformity.   Lumbar wound intact   Neurological: He is alert.   Skin: Skin is warm and dry. No rash noted. No erythema.   Nursing note and vitals reviewed.      Labs and Data:    Lab Results   Component Value Date    WBC 10.95 (H) 04/12/2018    HGB 13.5 (L) 04/12/2018    HCT 40.0 04/12/2018     04/12/2018    ALT 15 (L) 04/03/2018    AST 20 04/03/2018     04/12/2018    K 4.5 04/12/2018     04/12/2018    CREATININE 0.9 04/12/2018    BUN 13 04/12/2018    CO2 28 04/12/2018    INR 1.0 04/09/2018       Radiology:  Not applicable    Telemetry  N/A        PERFECTO Vazquez  4/12/2018

## 2018-04-16 ENCOUNTER — HOSPITAL ENCOUNTER (OUTPATIENT)
Dept: HOSPITAL 45 - C.ULTRBC | Age: 73
Discharge: HOME | End: 2018-04-16
Attending: STUDENT IN AN ORGANIZED HEALTH CARE EDUCATION/TRAINING PROGRAM
Payer: COMMERCIAL

## 2018-04-16 DIAGNOSIS — Z86.79: Primary | ICD-10-CM

## 2018-04-16 DIAGNOSIS — I82.811: ICD-10-CM

## 2018-04-16 NOTE — DIAGNOSTIC IMAGING REPORT
RIGHT LOWER EXTREMITY VENOUS DOPPLER



HISTORY:      Right leg pain and swelling.



COMPARISON STUDY:  None.



FINDINGS: There is normal compressibility, flow, and augmentation within the

right lower extremity deep venous system. Thrombosed superficial varicosities

within the proximal to mid right calf.



IMPRESSION:  

No DVT within the right lower extremity. Thrombosed superficial varicosities

within the proximal to mid right calf.







Electronically signed by:  Ceferino Perez M.D.

4/16/2018 1:09 PM



Dictated Date/Time:  4/16/2018 1:08 PM

## 2018-04-17 NOTE — ANESTHESIA POSTPROCEDURE EVALUATION
Patient: Sukumar Garcia    Procedure Summary     Date:  04/09/18 Room / Location:   OR 4 / [facility] OR    Anesthesia Start:  1323 Anesthesia Stop:  1612    Procedure:  L3-4 posterior lumbar decompression, posterior interbody fusion instrumentation cage allograft autgraft,revision L4-5 instrumentation (N/A Spine Lumbar) Diagnosis:  (acquired degenerative, stenosis, lumbar region)    Surgeon:  Tu Navarro MD Responsible Provider:  Colton Dickson MD    Anesthesia Type:  general ASA Status:  2          Anesthesia Type: general  PACU Vitals  4/9/2018 1604 - 4/9/2018 1704      4/9/2018 1609 4/9/2018 1610 4/9/2018 1611 4/9/2018 1612    BP: 139/86 (!)  154/80 - -    Temp: 36.7 °C (98.1 °F) - - -    Pulse: 78 78 72 70    Resp: 17 12 (!)  8 (!)  8    SpO2: 99 % 99 % 100 % 98 %              4/9/2018 1613 4/9/2018 1614 4/9/2018 1615 4/9/2018 1616    BP: - - - -    Temp: - - - -    Pulse: 77 77 72 76    Resp: (!)  10 12 (!)  11 18    SpO2: 100 % 95 % 95 % 98 %              4/9/2018 1617 4/9/2018 1618 4/9/2018 1619 4/9/2018 1620    BP: - - - -    Temp: - - - -    Pulse: 73 75 78 79    Resp: 15 19 19 12    SpO2: 97 % 97 % 100 % 100 %              4/9/2018 1621 4/9/2018 1622 4/9/2018 1623 4/9/2018 1624    BP: (!)  154/124 - (!)  164/69 -    Temp: - - - -    Pulse: 78 72 72 72    Resp: 12 13 17 15    SpO2: 100 % 100 % 100 % 98 %              4/9/2018 1625 4/9/2018 1626 4/9/2018 1627 4/9/2018 1628    BP: - - - -    Temp: - - - -    Pulse: 76 67 70 72    Resp: (!)  11 14 (!)  10 18    SpO2: 99 % 99 % 98 % 98 %              4/9/2018 1629 4/9/2018 1630 4/9/2018 1631 4/9/2018 1632    BP: - (!)  150/63 - -    Temp: - - - -    Pulse: 64 70 62 69    Resp: (!)  8 13 12 12    SpO2: 98 % 98 % 99 % 99 %              4/9/2018 1633 4/9/2018 1634 4/9/2018 1635 4/9/2018 1636    BP: - - - -    Temp: - - - -    Pulse: 60 64 61 66    Resp: (!)  9 (!)  9 (!)  9 15    SpO2: 96 % 96 % 98 % 98 %              4/9/2018 1637 4/9/2018 1638 4/9/2018 1639  4/9/2018 1640    BP: - - - (!)  164/86    Temp: - - - -    Pulse: 64 69 61 60    Resp: (!)  10 13 (!)  9 (!)  10    SpO2: 100 % 99 % 98 % 99 %              4/9/2018 1641 4/9/2018 1642 4/9/2018 1643 4/9/2018 1644    BP: - - - -    Temp: - - - -    Pulse: 62 63 66 64    Resp: (!)  11 (!)  8 (!)  10 (!)  6    SpO2: 98 % 97 % 99 % 99 %              4/9/2018 1645 4/9/2018 1646 4/9/2018 1647 4/9/2018 1648    BP: - - - -    Temp: - - - -    Pulse: (!)  57 68 60 62    Resp: (!)  7 (!)  9 (!)  7 (!)  6    SpO2: 97 % 95 % 98 % 98 %              4/9/2018 1649 4/9/2018 1650 4/9/2018 1700       BP: - (!)  149/68 136/65     Temp: - - -     Pulse: (!)  48 (!)  55 (!)  57     Resp: (!)  8 (!)  11 14     SpO2: 97 % 96 % 98 %             Anesthesia Post Evaluation    Pain management: adequate  Patient participation: complete - patient participated  Level of consciousness: awake and alert  Cardiovascular status: acceptable  Airway Patency: adequate  Respiratory status: acceptable  Hydration status: acceptable  Anesthetic complications: no
